# Patient Record
Sex: MALE | Race: BLACK OR AFRICAN AMERICAN | Employment: FULL TIME | URBAN - METROPOLITAN AREA
[De-identification: names, ages, dates, MRNs, and addresses within clinical notes are randomized per-mention and may not be internally consistent; named-entity substitution may affect disease eponyms.]

---

## 2020-10-03 ENCOUNTER — HOSPITAL ENCOUNTER (EMERGENCY)
Age: 45
Discharge: HOME | End: 2020-10-03
Attending: EMERGENCY MEDICINE
Payer: COMMERCIAL

## 2020-10-03 VITALS
HEART RATE: 100 BPM | SYSTOLIC BLOOD PRESSURE: 123 MMHG | OXYGEN SATURATION: 94 % | BODY MASS INDEX: 35.55 KG/M2 | TEMPERATURE: 98.4 F | WEIGHT: 234.57 LBS | DIASTOLIC BLOOD PRESSURE: 94 MMHG | RESPIRATION RATE: 20 BRPM | HEIGHT: 68 IN

## 2020-10-03 DIAGNOSIS — L02.91 ABSCESS: Primary | ICD-10-CM

## 2020-10-03 PROCEDURE — 99282 EMERGENCY DEPT VISIT SF MDM: CPT

## 2020-10-03 PROCEDURE — 75810000289 HC I&D ABSCESS SIMP/COMP/MULT

## 2020-10-03 PROCEDURE — 74011000250 HC RX REV CODE- 250: Performed by: EMERGENCY MEDICINE

## 2020-10-03 RX ORDER — CEPHALEXIN 500 MG/1
500 CAPSULE ORAL 4 TIMES DAILY
Qty: 28 CAP | Refills: 0 | Status: SHIPPED | OUTPATIENT
Start: 2020-10-03 | End: 2020-10-10

## 2020-10-03 RX ORDER — LIDOCAINE HYDROCHLORIDE AND EPINEPHRINE 10; 10 MG/ML; UG/ML
10 INJECTION, SOLUTION INFILTRATION; PERINEURAL
Status: COMPLETED | OUTPATIENT
Start: 2020-10-03 | End: 2020-10-03

## 2020-10-03 RX ORDER — FENOFIBRATE 145 MG/1
145 TABLET, COATED ORAL
COMMUNITY
Start: 2020-08-31 | End: 2020-12-15 | Stop reason: SDUPTHER

## 2020-10-03 RX ORDER — METFORMIN HYDROCHLORIDE 500 MG/1
500 TABLET, EXTENDED RELEASE ORAL
COMMUNITY
Start: 2020-08-31 | End: 2020-12-15 | Stop reason: SDUPTHER

## 2020-10-03 RX ADMIN — LIDOCAINE HYDROCHLORIDE,EPINEPHRINE BITARTRATE 100 MG: 10; .01 INJECTION, SOLUTION INFILTRATION; PERINEURAL at 15:48

## 2020-10-03 NOTE — ED TRIAGE NOTES
Pt reports \"he has a boil in between his butt that has been there for one week. \" Pt reports \"it started to drain and then it stopped. \" Pt denies fevers or medications prior to coming to ED.

## 2020-10-03 NOTE — ED NOTES
Discharge instructions reviewed with pt and copy given along with RX by this RN. Pt educated on follow up with PCP and to take full course of antibiotics as prescribed by ER MD Parag Sanchez. Pt verbalized understanding of all education and is ambulatory from ED accompanied by spouse in no sign of distress or discomfort.

## 2020-10-03 NOTE — ED PROVIDER NOTES
42-year-old male with a history of abscesses presents with a chief complaint of an abscess. The patient reports that he has had an abscess in the crack of his bottom for the last week. He has not had any fevers. He has been doing sitz bath's with no relief. Past Medical History:   Diagnosis Date    Abscess        History reviewed. No pertinent surgical history. History reviewed. No pertinent family history. Social History     Socioeconomic History    Marital status:      Spouse name: Not on file    Number of children: Not on file    Years of education: Not on file    Highest education level: Not on file   Occupational History    Not on file   Social Needs    Financial resource strain: Not on file    Food insecurity     Worry: Not on file     Inability: Not on file    Transportation needs     Medical: Not on file     Non-medical: Not on file   Tobacco Use    Smoking status: Current Every Day Smoker     Packs/day: 0.50     Years: 17.00     Pack years: 8.50    Smokeless tobacco: Never Used   Substance and Sexual Activity    Alcohol use:  Yes     Alcohol/week: 9.0 standard drinks     Types: 6 Cans of beer, 3 Shots of liquor per week    Drug use: Never    Sexual activity: Not on file   Lifestyle    Physical activity     Days per week: Not on file     Minutes per session: Not on file    Stress: Not on file   Relationships    Social connections     Talks on phone: Not on file     Gets together: Not on file     Attends [employer] service: Not on file     Active member of club or organization: Not on file     Attends meetings of clubs or organizations: Not on file     Relationship status: Not on file    Intimate partner violence     Fear of current or ex partner: Not on file     Emotionally abused: Not on file     Physically abused: Not on file     Forced sexual activity: Not on file   Other Topics Concern    Not on file   Social History Narrative    Not on file ALLERGIES: Patient has no known allergies. Review of Systems   Constitutional: Negative for fever. Skin: Positive for wound. Vitals:    10/03/20 1509   BP: (!) 123/94   Pulse: 100   Resp: 20   Temp: 98.4 °F (36.9 °C)   SpO2: 94%   Weight: 106.4 kg (234 lb 9.1 oz)   Height: 5' 8\" (1.727 m)            Physical Exam  Vitals signs and nursing note reviewed. Constitutional:       General: He is not in acute distress. Appearance: Normal appearance. He is not ill-appearing, toxic-appearing or diaphoretic. HENT:      Head: Normocephalic. Eyes:      Extraocular Movements: Extraocular movements intact. Neck:      Musculoskeletal: Normal range of motion. Cardiovascular:      Rate and Rhythm: Normal rate. Pulses: Normal pulses. Heart sounds: Normal heart sounds. No murmur. No friction rub. No gallop. Pulmonary:      Effort: Pulmonary effort is normal. No respiratory distress. Breath sounds: Normal breath sounds. No wheezing, rhonchi or rales. Abdominal:      General: There is no distension. Tenderness: There is no abdominal tenderness. Genitourinary:     Comments: Induration on the right buttocks near the top of the intergluteal cleft  Musculoskeletal: Normal range of motion. Skin:     General: Skin is warm and dry. Capillary Refill: Capillary refill takes less than 2 seconds. Neurological:      Mental Status: He is alert and oriented to person, place, and time. Psychiatric:         Mood and Affect: Mood normal.          MDM  Number of Diagnoses or Management Options  Abscess:   Diagnosis management comments: Patient presents with an abscess at the intergluteal fold. Abscess drained under ultrasound guidance. See procedure note for details. Patient placed on Keflex and I did recommend that he follow-up with his primary care physician. He is comfortable and agreeable to plan of care and aware of his return precautions.          I&D Abcess Simple    Date/Time: 10/4/2020 7:49 AM  Performed by: Sarahi Navarro MD  Authorized by: Sarahi Navarro MD     Consent:     Consent obtained:  Verbal    Consent given by:  Patient    Risks discussed:  Bleeding, incomplete drainage and pain    Alternatives discussed:  Alternative treatment  Location:     Type:  Abscess    Location:  Anogenital    Anogenital location:  Pilonidal  Pre-procedure details:     Skin preparation:  Antiseptic wash  Anesthesia (see MAR for exact dosages): Anesthesia method:  Local infiltration    Local anesthetic:  Lidocaine 1% WITH epi  Procedure type:     Complexity:  Simple  Procedure details:     Needle aspiration: no      Incision types:  Stab incision    Incision depth:  Submucosal    Scalpel blade:  11    Wound management:  Probed and deloculated    Drainage:  Bloody and purulent    Drainage amount: Moderate    Wound treatment:  Wound left open    Packing materials:  None  Post-procedure details:     Patient tolerance of procedure:   Tolerated well, no immediate complications

## 2020-12-15 ENCOUNTER — OFFICE VISIT (OUTPATIENT)
Dept: PRIMARY CARE CLINIC | Age: 45
End: 2020-12-15
Payer: MEDICAID

## 2020-12-15 VITALS
RESPIRATION RATE: 16 BRPM | OXYGEN SATURATION: 97 % | HEART RATE: 73 BPM | WEIGHT: 231.8 LBS | HEIGHT: 69 IN | TEMPERATURE: 97.8 F | SYSTOLIC BLOOD PRESSURE: 123 MMHG | DIASTOLIC BLOOD PRESSURE: 85 MMHG | BODY MASS INDEX: 34.33 KG/M2

## 2020-12-15 DIAGNOSIS — Z76.89 ENCOUNTER TO ESTABLISH CARE: ICD-10-CM

## 2020-12-15 DIAGNOSIS — R73.03 PRE-DIABETES: Primary | ICD-10-CM

## 2020-12-15 DIAGNOSIS — E66.01 SEVERE OBESITY (HCC): ICD-10-CM

## 2020-12-15 DIAGNOSIS — Z23 NEEDS FLU SHOT: ICD-10-CM

## 2020-12-15 DIAGNOSIS — Z76.0 MEDICATION REFILL: ICD-10-CM

## 2020-12-15 DIAGNOSIS — E78.5 HYPERLIPIDEMIA, UNSPECIFIED HYPERLIPIDEMIA TYPE: ICD-10-CM

## 2020-12-15 DIAGNOSIS — R79.89 LOW TESTOSTERONE: ICD-10-CM

## 2020-12-15 DIAGNOSIS — R73.03 PRE-DIABETES: ICD-10-CM

## 2020-12-15 DIAGNOSIS — F17.200 SMOKES: ICD-10-CM

## 2020-12-15 PROCEDURE — 99204 OFFICE O/P NEW MOD 45 MIN: CPT | Performed by: NURSE PRACTITIONER

## 2020-12-15 PROCEDURE — 90686 IIV4 VACC NO PRSV 0.5 ML IM: CPT | Performed by: NURSE PRACTITIONER

## 2020-12-15 PROCEDURE — 90471 IMMUNIZATION ADMIN: CPT | Performed by: NURSE PRACTITIONER

## 2020-12-15 RX ORDER — FENOFIBRATE 145 MG/1
145 TABLET, COATED ORAL DAILY
Qty: 90 TAB | Refills: 1 | Status: SHIPPED | OUTPATIENT
Start: 2020-12-15

## 2020-12-15 RX ORDER — METFORMIN HYDROCHLORIDE 500 MG/1
500 TABLET, EXTENDED RELEASE ORAL
Qty: 90 TAB | Refills: 1 | Status: SHIPPED | OUTPATIENT
Start: 2020-12-15

## 2020-12-15 NOTE — PATIENT INSTRUCTIONS
Vaccine Information Statement Influenza (Flu) Vaccine (Inactivated or Recombinant): What You Need to Know Many Vaccine Information Statements are available in [place]ian and other languages. See www.immunize.org/vis Hojas de información sobre vacunas están disponibles en español y en muchos otros idiomas. Visite www.immunize.org/vis 1. Why get vaccinated? Influenza vaccine can prevent influenza (flu). Flu is a contagious disease that spreads around the [facility] every year, usually between October and May. Anyone can get the flu, but it is more dangerous for some people. Infants and young children, people 72years of age and older, pregnant women, and people with certain health conditions or a weakened immune system are at greatest risk of flu complications. Pneumonia, bronchitis, sinus infections and ear infections are examples of flu-related complications. If you have a medical condition, such as heart disease, cancer or diabetes, flu can make it worse. Flu can cause fever and chills, sore throat, muscle aches, fatigue, cough, headache, and runny or stuffy nose. Some people may have vomiting and diarrhea, though this is more common in children than adults. Each year thousands of people in the [facility] die from flu, and many more are hospitalized. Flu vaccine prevents millions of illnesses and flu-related visits to the doctor each year. 2. Influenza vaccines CDC recommends everyone 10months of age and older get vaccinated every flu season. Children 6 months through 6years of age may need 2 doses during a single flu season. Everyone else needs only 1 dose each flu season. It takes about 2 weeks for protection to develop after vaccination. There are many flu viruses, and they are always changing. Each year a new flu vaccine is made to protect against three or four viruses that are likely to cause disease in the upcoming flu season.  Even when the vaccine doesnt exactly match these viruses, it may still provide some protection. Influenza vaccine does not cause flu. Influenza vaccine may be given at the same time as other vaccines. 3. Talk with your health care provider Tell your vaccine provider if the person getting the vaccine: 
 Has had an allergic reaction after a previous dose of influenza vaccine, or has any severe, life-threatening allergies.  Has ever had Guillain-Barré Syndrome (also called GBS). In some cases, your health care provider may decide to postpone influenza vaccination to a future visit. People with minor illnesses, such as a cold, may be vaccinated. People who are moderately or severely ill should usually wait until they recover before getting influenza vaccine. Your health care provider can give you more information. 4. Risks of a reaction  Soreness, redness, and swelling where shot is given, fever, muscle aches, and headache can happen after influenza vaccine.  There may be a very small increased risk of Guillain-Barré Syndrome (GBS) after inactivated influenza vaccine (the flu shot). Blayne Marrero children who get the flu shot along with pneumococcal vaccine (PCV13), and/or DTaP vaccine at the same time might be slightly more likely to have a seizure caused by fever. Tell your health care provider if a child who is getting flu vaccine has ever had a seizure. People sometimes faint after medical procedures, including vaccination. Tell your provider if you feel dizzy or have vision changes or ringing in the ears. As with any medicine, there is a very remote chance of a vaccine causing a severe allergic reaction, other serious injury, or death. 5. What if there is a serious problem? An allergic reaction could occur after the vaccinated person leaves the clinic.  If you see signs of a severe allergic reaction (hives, swelling of the face and throat, difficulty breathing, a fast heartbeat, dizziness, or weakness), call 9-1-1 and get the person to the nearest hospital. 
 
For other signs that concern you, call your health care provider. Adverse reactions should be reported to the Vaccine Adverse Event Reporting System (VAERS). Your health care provider will usually file this report, or you can do it yourself. Visit the VAERS website at www.vaers. hhs.gov or call 0-763.759.7656. VAERS is only for reporting reactions, and VAERS staff do not give medical advice. 6. The National Vaccine Injury Compensation Program 
 
The [facility] Vaccine Injury Compensation Program (VICP) is a federal program that was created to compensate people who may have been injured by certain vaccines. Visit the VICP website at www.[employer].gov/vaccinecompensation or call 7-870.913.7065 to learn about the program and about filing a claim. There is a time limit to file a claim for compensation. 7. How can I learn more?  Ask your health care provider.  Call your local or state health department.  Contact the Centers for Disease Control and Prevention (CDC): 
- Call 7-828.483.8325 (4-055-KQB-INFO) or 
- Visit CDCs influenza website at www.cdc.gov/flu Vaccine Information Statement (Interim) Inactivated Influenza Vaccine 8/15/2019 
42 HOLLYDomonique Tate [id number]-[id number] Department of [facility] and [employer] Centers for Disease Control and Prevention Office Use Only Learning About High Cholesterol What is high cholesterol? High cholesterol means that you have too much cholesterol in your blood. Cholesterol is a type of fat. It's needed for many body functions, such as making new cells. Cholesterol is made by your body. It also comes from food you eat. Having high cholesterol can lead to the buildup of plaque in artery walls. This can increase your risk of heart disease and stroke.  
When your doctor talks about high cholesterol levels, he or she is talking about your total cholesterol and LDL cholesterol (the \"bad\" cholesterol) levels. Your doctor may also speak about HDL (the \"good\" cholesterol) levels. High HDL is linked with a lower risk for heart disease, heart attack, and stroke. Your cholesterol levels help your doctor find out your risk for having a heart attack or stroke. How can you prevent high cholesterol? A heart-healthy lifestyle can help you prevent high cholesterol. This lifestyle helps lower your risk for a heart attack and stroke. · Eat heart-healthy foods. ? Eat fruits, vegetables, whole grains (like oatmeal), dried beans and peas, nuts and seeds, soy products (like tofu), and fat-free or low-fat dairy products. ? Replace butter, margarine, and hydrogenated or partially hydrogenated oils with olive and canola oils. (Canola oil margarine without trans fat is fine.) ? Replace red meat with fish, poultry, and soy protein (like tofu). ? Limit processed and packaged foods like chips, crackers, and cookies. · Be active. Exercise can improve your cholesterol level. Get at least 30 minutes of exercise on most days of the week. Walking is a good choice. You also may want to do other activities, such as running, swimming, cycling, or playing tennis or team sports. · Stay at a healthy weight. Lose weight if you need to. · Don't smoke. If you need help quitting, talk to your doctor about stop-smoking programs and medicines. These can increase your chances of quitting for good. How is high cholesterol treated? The goal of treatment is to reduce your chances of having a heart attack or stroke. The goal is not to lower your cholesterol numbers only. · You may make lifestyle changes, such as eating healthy foods, not smoking, losing weight, and being more active. · You may have to take medicine. Follow-up care is a key part of your treatment and safety. Be sure to make and go to all appointments, and call your doctor if you are having problems.  It's also a good idea to know your test results and keep a list of the medicines you take. Where can you learn more? Go to http://www.gray.com/ Enter Q315 in the search box to learn more about \"Learning About High Cholesterol. \" Current as of: December 16, 2019               Content Version: 12.6 © 8024-0819 [employer]. Care instructions adapted under license by [employer] (which disclaims liability or warranty for this information). If you have questions about a medical condition or this instruction, always ask your healthcare professional. [address] any warranty or liability for your use of this information. Stopping Smoking: Care Instructions Your Care Instructions Cigarette smokers crave the nicotine in cigarettes. Giving it up is much harder than simply changing a habit. Your body has to stop craving the nicotine. It is hard to quit, but you can do it. There are many tools that people use to quit smoking. You may find that combining tools works best for you. There are several steps to quitting. First you get ready to quit. Then you get support to help you. After that, you learn new skills and behaviors to become a nonsmoker. For many people, a necessary step is getting and using medicine. Your doctor will help you set up the plan that best meets your needs. You may want to attend a smoking cessation program to help you quit smoking. When you choose a program, look for one that has proven success. Ask your doctor for ideas. You will greatly increase your chances of success if you take medicine as well as get counseling or join a cessation program. 
Some of the changes you feel when you first quit tobacco are uncomfortable. Your body will miss the nicotine at first, and you may feel short-tempered and grumpy. You may have trouble sleeping or concentrating. Medicine can help you deal with these symptoms.  You may struggle with changing your smoking habits and rituals. The last step is the tricky one: Be prepared for the smoking urge to continue for a time. This is a lot to deal with, but keep at it. You will feel better. Follow-up care is a key part of your treatment and safety. Be sure to make and go to all appointments, and call your doctor if you are having problems. It's also a good idea to know your test results and keep a list of the medicines you take. How can you care for yourself at home? · Ask your family, friends, and coworkers for support. You have a better chance of quitting if you have help and support. · Join a support group, such as Nicotine Anonymous, for people who are trying to quit smoking. · Consider signing up for a smoking cessation program, such as the American Lung Association's Freedom from Smoking program. 
· Get text messaging support. Go to the website at www.smokefree. gov to sign up for the [facility] program. 
· Set a quit date. Pick your date carefully so that it is not right in the middle of a big deadline or stressful time. Once you quit, do not even take a puff. Get rid of all ashtrays and lighters after your last cigarette. Clean your house and your clothes so that they do not smell of smoke. · Learn how to be a nonsmoker. Think about ways you can avoid those things that make you reach for a cigarette. ? Avoid situations that put you at greatest risk for smoking. For some people, it is hard to have a drink with friends without smoking. For others, they might skip a coffee break with coworkers who smoke. ? Change your daily routine. Take a different route to work or eat a meal in a different place. · Cut down on stress. Calm yourself or release tension by doing an activity you enjoy, such as reading a book, taking a hot bath, or gardening. · Talk to your doctor or pharmacist about nicotine replacement therapy, which replaces the nicotine in your body.  You still get nicotine but you do not use tobacco. Nicotine replacement products help you slowly reduce the amount of nicotine you need. These products come in several forms, many of them available over-the-counter: ? Nicotine patches ? Nicotine gum and lozenges ? Nicotine inhaler · Ask your doctor about bupropion (Wellbutrin) or varenicline (Chantix), which are prescription medicines. They do not contain nicotine. They help you by reducing withdrawal symptoms, such as stress and anxiety. · Some people find hypnosis, acupuncture, and massage helpful for ending the smoking habit. · Eat a healthy diet and get regular exercise. Having healthy habits will help your body move past its craving for nicotine. · Be prepared to keep trying. Most people are not successful the first few times they try to quit. Do not get mad at yourself if you smoke again. Make a list of things you learned and think about when you want to try again, such as next week, next month, or next year. Where can you learn more? Go to http://www.gray.com/ Enter Y412 in the search box to learn more about \"Stopping Smoking: Care Instructions. \" Current as of: March 12, 2020               Content Version: 12.6 © 0183-9839 [employer]. Care instructions adapted under license by [employer] (which disclaims liability or warranty for this information). If you have questions about a medical condition or this instruction, always ask your healthcare professional. [place] [address] any warranty or liability for your use of this information.

## 2020-12-15 NOTE — PROGRESS NOTES
Chief Complaint   Patient presents with    New Patient     establishing primary care      1. Have you been to the ER, urgent care clinic since your last visit? Hospitalized since your last visit? [facility]    2. Have you seen or consulted any other health care providers outside of the [address] [place] [address] since your last visit? Include any pap smears or colon screening.  Yes Where: [facility]

## 2020-12-15 NOTE — PROGRESS NOTES
Patient went to primary care in [place], [place], and doesn't remember name. Patient needs refills in both meds documented today. Patient is from [place], has been in [place] for 2.5 years.

## 2020-12-15 NOTE — PROGRESS NOTES
This note will not be viewable in [address] Maggi Meneses is a 41y.o. year old male who is a new patient to me today. Maggi Meneses is a  41 y.o. male presents for visit. Chief Complaint   Patient presents with    New Patient     establishing primary care     Medication Refill    Labs       HPI    Patient presents with his wife to establish care, for medication refills and lab work. Reports a history of prediabetes, hyperlipidemia, low testosterone level. Endorses decreased libido. He takes metformin for prediabetes and TriCor for hyperlipidemia. Requests refills. Does not check his blood sugar at home. He smokes cigarettes and BMI is 34.7, obese. Review of Systems   Constitutional: Negative for chills and fever. Respiratory: Negative for cough and shortness of breath. Cardiovascular: Negative for chest pain and palpitations. Gastrointestinal: Negative for abdominal pain, blood in stool, constipation, heartburn, nausea and vomiting. Increased BM frequency   Genitourinary: Negative. Decreased libido. All other systems reviewed and are negative. Past Medical History:   Diagnosis Date    Abscess     Diabetes ([facility] [address]     History of high cholesterol     Low testosterone in male 2017      History reviewed. No pertinent surgical history. Social History     Tobacco Use    Smoking status: Current Every Day Smoker     Packs/day: 0.50     Years: 17.00     Pack years: 8.50    Smokeless tobacco: Never Used   Substance Use Topics    Alcohol use: Yes     Alcohol/week: 9.0 standard drinks     Types: 6 Cans of beer, 3 Shots of liquor per week     Comment: occasionally 2-3 times a week      Social History     Social History Narrative    Not on file     Family History   Problem Relation Age of Onset    Diabetes Mother     Deep Vein Thrombosis Mother       Prior to Admission medications    Medication Sig Start Date End Date Taking?  Authorizing Provider metFORMIN ER (GLUCOPHAGE XR) 500 mg tablet Take 1 Tab by mouth daily (with dinner). 1 x time daily with dinner 12/15/20  Yes Viridiana Murphy NP   fenofibrate nanocrystallized (TRICOR) 145 mg tablet Take 1 Tab by mouth daily. Takes by mouth 1x daily with dinner. 12/15/20  Yes Deborah Bhat NP      No Known Allergies       Visit Vitals  /85 (BP 1 Location: Right arm, BP Patient Position: Sitting)   Pulse 73   Temp 97.8 °F (36.6 °C) (Temporal)   Resp 16   Ht 5' 8.5\" (1.74 m)   Wt 231 lb 12.8 oz (105.1 kg)   SpO2 97%   BMI 34.73 kg/m²     Physical Exam  Vitals signs and nursing note reviewed. Constitutional:       General: He is not in acute distress. Appearance: Normal appearance. He is well-developed. He is obese. HENT:      Head: Normocephalic and atraumatic. Right Ear: External ear normal.      Left Ear: External ear normal.      Nose: Nose normal.   Eyes:      Conjunctiva/sclera: Conjunctivae normal.   Neck:      Musculoskeletal: Neck supple. Cardiovascular:      Rate and Rhythm: Normal rate and regular rhythm. Heart sounds: Normal heart sounds. Pulmonary:      Effort: Pulmonary effort is normal.      Breath sounds: Normal breath sounds. No wheezing. Chest:      Chest wall: No tenderness. Abdominal:      General: Bowel sounds are normal. There is no distension. Palpations: Abdomen is soft. Tenderness: There is no abdominal tenderness. Musculoskeletal:      Right lower leg: No edema. Left lower leg: No edema. Skin:     General: Skin is warm and dry. Neurological:      Mental Status: He is alert and oriented to person, place, and time. Psychiatric:         Mood and Affect: Mood normal.         Speech: Speech normal.         Behavior: Behavior normal.               No results found for this or any previous visit (from the past 24 hour(s)). ASSESSMENT AND PLAN:  There are no active problems to display for this patient.       ICD-10-CM ICD-9-CM 1. Pre-diabetes  R73.03 790.29   2. Low testosterone  R79.89 790.99   3. Hyperlipidemia, unspecified hyperlipidemia type  E78.5 272.4   4. Severe obesity ([address]  E66.01 278.01   5. Smokes  F17.200 305.1   6. Needs flu shot  Z23 V04.81   7. Medication refill  Z76.0 V68.1   8. Encounter to establish care  Z76.89 V65.8     Orders Placed This Encounter    Influenza Virus Vaccine QUAD, PF Syr 6 Months + (Flulaval, Fluzone, Fluarix [id number])    LIPID PANEL     Standing Status:   Future     Number of Occurrences:   1     Standing Expiration Date:   12/15/2021    HEMOGLOBIN A1C WITH EAG     Standing Status:   Future     Number of Occurrences:   1     Standing Expiration Date:   12/15/2021    TESTOSTERONE, FREE & TOTAL     Standing Status:   Future     Number of Occurrences:   1     Standing Expiration Date:   45/08/3215    METABOLIC PANEL, BASIC     Standing Status:   Future     Number of Occurrences:   1     Standing Expiration Date:   43/51/8749    METABOLIC PANEL, BASIC    LIPID PANEL    HEMOGLOBIN A1C WITH EAG    TESTOSTERONE, FREE & TOTAL    metFORMIN ER (GLUCOPHAGE XR) 500 mg tablet     Sig: Take 1 Tab by mouth daily (with dinner). 1 x time daily with dinner     Dispense:  90 Tab     Refill:  1    fenofibrate nanocrystallized (TRICOR) 145 mg tablet     Sig: Take 1 Tab by mouth daily. Takes by mouth 1x daily with dinner. Dispense:  90 Tab     Refill:  1       Diagnoses and all orders for this visit:    1. Pre-diabetes  -     HEMOGLOBIN A1C WITH EAG; Future  -     metFORMIN ER (GLUCOPHAGE XR) 500 mg tablet; Take 1 Tab by mouth daily (with dinner). 1 x time daily with dinner  -     METABOLIC PANEL, BASIC; Future    2. Low testosterone  -     TESTOSTERONE, FREE & TOTAL; Future       -      Advised patient I will refer to urology for treatment if testosterone level low. 3. Hyperlipidemia, unspecified hyperlipidemia type  -     LIPID PANEL;  Future  -     fenofibrate nanocrystallized (TRICOR) 145 mg tablet; Take 1 Tab by mouth daily. Takes by mouth 1x daily with dinner.  -     METABOLIC PANEL, BASIC; Future    4. Smokes    5. Needs flu shot  -     INFLUENZA VIRUS VAC QUAD,SPLIT,PRESV FREE SYRINGE IM    6. Medication refill  -     metFORMIN ER (GLUCOPHAGE XR) 500 mg tablet; Take 1 Tab by mouth daily (with dinner). 1 x time daily with dinner  -     fenofibrate nanocrystallized (TRICOR) 145 mg tablet; Take 1 Tab by mouth daily. Takes by mouth 1x daily with dinner. 7. Encounter to establish care      lab results and schedule of future lab studies reviewed with patient  reviewed diet, exercise and weight control  very strongly urged to quit smoking to reduce cardiovascular risk  Continue current treatment pending lab results. Follow-up and Dispositions    · Return in about 6 months (around 6/15/2021), or if symptoms worsen or fail to improve, for chronic care. Disclaimer:  Advised him to call back or return to office if symptoms worsen/change/persist.  Discussed expected course/resolution/complications of diagnosis in detail with patient. Medication risks/benefits/costs/interactions/alternatives discussed with patient. He was given an after visit summary which includes diagnoses, current medications, & vitals. He expressed understanding with the diagnosis and plan.

## 2020-12-18 LAB
BUN SERPL-MCNC: 10 MG/DL (ref 6–24)
BUN/CREAT SERPL: 9 (ref 9–20)
CALCIUM SERPL-MCNC: 9.1 MG/DL (ref 8.7–10.2)
CHLORIDE SERPL-SCNC: 105 MMOL/L (ref 96–106)
CHOLEST SERPL-MCNC: 228 MG/DL (ref 100–199)
CO2 SERPL-SCNC: 22 MMOL/L (ref 20–29)
CREAT SERPL-MCNC: 1.17 MG/DL (ref 0.76–1.27)
EST. AVERAGE GLUCOSE BLD GHB EST-MCNC: 140 MG/DL
GLUCOSE SERPL-MCNC: 88 MG/DL (ref 65–99)
HBA1C MFR BLD: 6.5 % (ref 4.8–5.6)
HDLC SERPL-MCNC: 52 MG/DL
LDLC SERPL CALC-MCNC: 126 MG/DL (ref 0–99)
POTASSIUM SERPL-SCNC: 4.4 MMOL/L (ref 3.5–5.2)
SODIUM SERPL-SCNC: 141 MMOL/L (ref 134–144)
TESTOST FREE SERPL-MCNC: 13.5 PG/ML (ref 6.8–21.5)
TESTOST SERPL-MCNC: 367 NG/DL (ref 264–916)
TRIGL SERPL-MCNC: 285 MG/DL (ref 0–149)
VLDLC SERPL CALC-MCNC: 50 MG/DL (ref 5–40)

## 2020-12-28 DIAGNOSIS — R79.89 LOW TESTOSTERONE: Primary | ICD-10-CM

## 2020-12-28 NOTE — PROGRESS NOTES
Please call patient and let him know his cholesterol is high and HgA1c shows DM2. Please ask him to schedule an appointment to discuss med treatment options. VV is ok. Testosterone level is on the low side of normal. I referred him to [facility] per our discussion in the visit. Please provide the referral contact information. Thanks.

## 2020-12-28 NOTE — PROGRESS NOTES
Called patient and identified patient by , gave him Provider's notes, and referral information, patient was just arriving to work, and didn't have anything to write down referral.  Julio Ax him that we would mail it to him as well, but he needed to call back and schedule an appt in person or VV so that NP can discuss treatment options for DM2 and high cholesterol. Patient understood.

## 2021-02-25 ENCOUNTER — APPOINTMENT (OUTPATIENT)
Dept: CT IMAGING | Age: 46
End: 2021-02-25
Attending: EMERGENCY MEDICINE
Payer: MEDICAID

## 2021-02-25 ENCOUNTER — HOSPITAL ENCOUNTER (EMERGENCY)
Age: 46
Discharge: HOME | End: 2021-02-25
Attending: EMERGENCY MEDICINE
Payer: MEDICAID

## 2021-02-25 VITALS
OXYGEN SATURATION: 96 % | TEMPERATURE: 98.2 F | SYSTOLIC BLOOD PRESSURE: 116 MMHG | HEART RATE: 79 BPM | BODY MASS INDEX: 33.76 KG/M2 | DIASTOLIC BLOOD PRESSURE: 78 MMHG | WEIGHT: 225.31 LBS | RESPIRATION RATE: 16 BRPM

## 2021-02-25 DIAGNOSIS — R10.32 ABDOMINAL PAIN, LLQ (LEFT LOWER QUADRANT): ICD-10-CM

## 2021-02-25 DIAGNOSIS — L72.9 BENIGN SKIN CYST: ICD-10-CM

## 2021-02-25 DIAGNOSIS — K57.32 DIVERTICULITIS LARGE INTESTINE W/O PERFORATION OR ABSCESS W/O BLEEDING: Primary | ICD-10-CM

## 2021-02-25 DIAGNOSIS — R79.89 ELEVATED SERUM CREATININE: ICD-10-CM

## 2021-02-25 LAB
ALBUMIN SERPL-MCNC: 3.5 G/DL (ref 3.5–5)
ALBUMIN/GLOB SERPL: 0.8 {RATIO} (ref 1.1–2.2)
ALP SERPL-CCNC: 54 U/L (ref 45–117)
ALT SERPL-CCNC: 42 U/L (ref 12–78)
AMORPH CRY URNS QL MICRO: ABNORMAL
ANION GAP SERPL CALC-SCNC: 11 MMOL/L (ref 5–15)
APPEARANCE UR: ABNORMAL
AST SERPL-CCNC: 28 U/L (ref 15–37)
BACTERIA URNS QL MICRO: NEGATIVE /HPF
BASOPHILS # BLD: 0.1 K/UL (ref 0–0.1)
BASOPHILS NFR BLD: 1 % (ref 0–1)
BILIRUB SERPL-MCNC: 0.5 MG/DL (ref 0.2–1)
BILIRUB UR QL: NEGATIVE
BUN SERPL-MCNC: 14 MG/DL (ref 6–20)
BUN/CREAT SERPL: 10 (ref 12–20)
CALCIUM SERPL-MCNC: 9 MG/DL (ref 8.5–10.1)
CHLORIDE SERPL-SCNC: 104 MMOL/L (ref 97–108)
CO2 SERPL-SCNC: 25 MMOL/L (ref 21–32)
COLOR UR: ABNORMAL
COMMENT, HOLDF: NORMAL
CREAT SERPL-MCNC: 1.43 MG/DL (ref 0.7–1.3)
DIFFERENTIAL METHOD BLD: NORMAL
EOSINOPHIL # BLD: 0.1 K/UL (ref 0–0.4)
EOSINOPHIL NFR BLD: 1 % (ref 0–7)
EPITH CASTS URNS QL MICRO: ABNORMAL /LPF
ERYTHROCYTE [DISTWIDTH] IN BLOOD BY AUTOMATED COUNT: 12.3 % (ref 11.5–14.5)
GLOBULIN SER CALC-MCNC: 4.6 G/DL (ref 2–4)
GLUCOSE SERPL-MCNC: 106 MG/DL (ref 65–100)
GLUCOSE UR STRIP.AUTO-MCNC: NEGATIVE MG/DL
HCT VFR BLD AUTO: 42 % (ref 36.6–50.3)
HGB BLD-MCNC: 13.6 G/DL (ref 12.1–17)
HGB UR QL STRIP: NEGATIVE
IMM GRANULOCYTES # BLD AUTO: 0 K/UL (ref 0–0.04)
IMM GRANULOCYTES NFR BLD AUTO: 0 % (ref 0–0.5)
KETONES UR QL STRIP.AUTO: NEGATIVE MG/DL
LEUKOCYTE ESTERASE UR QL STRIP.AUTO: NEGATIVE
LIPASE SERPL-CCNC: 135 U/L (ref 73–393)
LYMPHOCYTES # BLD: 2.4 K/UL (ref 0.8–3.5)
LYMPHOCYTES NFR BLD: 23 % (ref 12–49)
MCH RBC QN AUTO: 31.3 PG (ref 26–34)
MCHC RBC AUTO-ENTMCNC: 32.4 G/DL (ref 30–36.5)
MCV RBC AUTO: 96.6 FL (ref 80–99)
MONOCYTES # BLD: 0.6 K/UL (ref 0–1)
MONOCYTES NFR BLD: 6 % (ref 5–13)
NEUTS SEG # BLD: 6.9 K/UL (ref 1.8–8)
NEUTS SEG NFR BLD: 69 % (ref 32–75)
NITRITE UR QL STRIP.AUTO: NEGATIVE
NRBC # BLD: 0 K/UL (ref 0–0.01)
NRBC BLD-RTO: 0 PER 100 WBC
PH UR STRIP: 5.5 [PH] (ref 5–8)
PLATELET # BLD AUTO: 200 K/UL (ref 150–400)
PMV BLD AUTO: 11.3 FL (ref 8.9–12.9)
POTASSIUM SERPL-SCNC: 3.7 MMOL/L (ref 3.5–5.1)
PROT SERPL-MCNC: 8.1 G/DL (ref 6.4–8.2)
PROT UR STRIP-MCNC: NEGATIVE MG/DL
RBC # BLD AUTO: 4.35 M/UL (ref 4.1–5.7)
RBC #/AREA URNS HPF: ABNORMAL /HPF (ref 0–5)
SAMPLES BEING HELD,HOLD: NORMAL
SODIUM SERPL-SCNC: 140 MMOL/L (ref 136–145)
SP GR UR REFRACTOMETRY: >1.03 (ref 1–1.03)
UR CULT HOLD, URHOLD: NORMAL
UROBILINOGEN UR QL STRIP.AUTO: 2 EU/DL (ref 0.2–1)
WBC # BLD AUTO: 10.1 K/UL (ref 4.1–11.1)
WBC URNS QL MICRO: ABNORMAL /HPF (ref 0–4)

## 2021-02-25 PROCEDURE — 96361 HYDRATE IV INFUSION ADD-ON: CPT

## 2021-02-25 PROCEDURE — 74177 CT ABD & PELVIS W/CONTRAST: CPT

## 2021-02-25 PROCEDURE — 83690 ASSAY OF LIPASE: CPT

## 2021-02-25 PROCEDURE — 36415 COLL VENOUS BLD VENIPUNCTURE: CPT

## 2021-02-25 PROCEDURE — 81001 URINALYSIS AUTO W/SCOPE: CPT

## 2021-02-25 PROCEDURE — 99284 EMERGENCY DEPT VISIT MOD MDM: CPT

## 2021-02-25 PROCEDURE — 96374 THER/PROPH/DIAG INJ IV PUSH: CPT

## 2021-02-25 PROCEDURE — 80053 COMPREHEN METABOLIC PANEL: CPT

## 2021-02-25 PROCEDURE — 74011000636 HC RX REV CODE- 636: Performed by: EMERGENCY MEDICINE

## 2021-02-25 PROCEDURE — 74011250636 HC RX REV CODE- 250/636: Performed by: EMERGENCY MEDICINE

## 2021-02-25 PROCEDURE — 85025 COMPLETE CBC W/AUTO DIFF WBC: CPT

## 2021-02-25 RX ORDER — SODIUM CHLORIDE 9 MG/ML
50 INJECTION, SOLUTION INTRAVENOUS
Status: DISCONTINUED | OUTPATIENT
Start: 2021-02-25 | End: 2021-02-25

## 2021-02-25 RX ORDER — KETOROLAC TROMETHAMINE 30 MG/ML
15 INJECTION, SOLUTION INTRAMUSCULAR; INTRAVENOUS
Status: COMPLETED | OUTPATIENT
Start: 2021-02-25 | End: 2021-02-25

## 2021-02-25 RX ORDER — AMOXICILLIN AND CLAVULANATE POTASSIUM 875; 125 MG/1; MG/1
1 TABLET, FILM COATED ORAL 2 TIMES DAILY
Qty: 20 TAB | Refills: 0 | Status: SHIPPED | OUTPATIENT
Start: 2021-02-25 | End: 2021-02-25 | Stop reason: SDUPTHER

## 2021-02-25 RX ORDER — AMOXICILLIN AND CLAVULANATE POTASSIUM 875; 125 MG/1; MG/1
1 TABLET, FILM COATED ORAL 2 TIMES DAILY
Qty: 20 TAB | Refills: 0 | Status: SHIPPED | OUTPATIENT
Start: 2021-02-25 | End: 2021-03-07

## 2021-02-25 RX ORDER — SODIUM CHLORIDE 9 MG/ML
10 INJECTION INTRAMUSCULAR; INTRAVENOUS; SUBCUTANEOUS ONCE
Status: DISCONTINUED | OUTPATIENT
Start: 2021-02-25 | End: 2021-02-25

## 2021-02-25 RX ADMIN — SODIUM CHLORIDE 1000 ML: 9 INJECTION, SOLUTION INTRAVENOUS at 19:16

## 2021-02-25 RX ADMIN — IOPAMIDOL 100 ML: 755 INJECTION, SOLUTION INTRAVENOUS at 19:45

## 2021-02-25 RX ADMIN — KETOROLAC TROMETHAMINE 15 MG: 30 INJECTION, SOLUTION INTRAMUSCULAR at 18:59

## 2021-02-25 NOTE — ED PROVIDER NOTES
Patient is a 69-year-old male with history of diabetes, hyperlipidemia, low testosterone, presenting to emergency department for evaluation of left lower quadrant abdominal pain which has been present for the past 5 days. Patient states that pain is constant and worsening. Pain is nonradiating. Pain seems to worsen after he eats. Last bowel movement was today which was normal for him. He denies fever, chills, chest pain, shortness of breath, nausea, vomiting, diarrhea, constipation, dysuria, hematuria, bloody stools, or any other medical complaints at this time. He has not had a colonoscopy. Is not followed by GI. Patient has an additional complaint of a painful area to the left mid back which is been present for the past several years, his wife states that she drained fluid from it several days ago which was dark with a foul odor. Please note that this dictation was completed with [employer], the computer voice recognition software.  Quite often unanticipated grammatical, syntax, homophones, and other interpretive errors are inadvertently transcribed by the computer software.  Please disregard these errors.  Please excuse any errors that have escaped final proofreading.              Past Medical History:   Diagnosis Date    Abscess     Diabetes ([facility] [address]     History of high cholesterol     Low testosterone in male 2017       Past Surgical History:   Procedure Laterality Date    HX HEENT      Left Eye         Family History:   Problem Relation Age of Onset    Diabetes Mother     Deep Vein Thrombosis Mother        Social History     Socioeconomic History    Marital status:      Spouse name: Not on file    Number of children: Not on file    Years of education: Not on file    Highest education level: Not on file   Occupational History    Not on file   Social Needs    Financial resource strain: Not on file    Food insecurity     Worry: Not on file     Inability: Not on file   LUBB-TEX needs     Medical: Not on file     Non-medical: Not on file   Tobacco Use    Smoking status: Current Every Day Smoker     Packs/day: 0.50     Years: 17.00     Pack years: 8.50    Smokeless tobacco: Never Used   Substance and Sexual Activity    Alcohol use: Yes     Alcohol/week: 9.0 standard drinks     Types: 6 Cans of beer, 3 Shots of liquor per week     Comment: occasionally 2-3 times a week    Drug use: Never    Sexual activity: Not on file   Lifestyle    Physical activity     Days per week: Not on file     Minutes per session: Not on file    Stress: Not on file   Relationships    Social connections     Talks on phone: Not on file     Gets together: Not on file     Attends [employer] service: Not on file     Active member of club or organization: Not on file     Attends meetings of clubs or organizations: Not on file     Relationship status: Not on file    Intimate partner violence     Fear of current or ex partner: Not on file     Emotionally abused: Not on file     Physically abused: Not on file     Forced sexual activity: Not on file   Other Topics Concern    Not on file   Social History Narrative    Not on file         ALLERGIES: Patient has no known allergies. Review of Systems   Constitutional: Negative for chills and fever. HENT: Negative for ear pain and sore throat. Eyes: Negative for visual disturbance. Respiratory: Negative for cough and shortness of breath. Cardiovascular: Negative for chest pain. Gastrointestinal: Positive for abdominal pain. Negative for blood in stool, diarrhea, nausea and vomiting. Genitourinary: Negative for flank pain. Musculoskeletal: Negative for back pain. Skin: Negative for color change. Neurological: Negative for dizziness and headaches. Psychiatric/Behavioral: Negative for confusion.        Vitals:    02/25/21 1830   BP: 130/88   Pulse: 90   Resp: 16   Temp: 98.2 °F (36.8 °C)   SpO2: 96%   Weight: 102.2 kg (225 lb 5 oz)            Physical Exam  Vitals signs and nursing note reviewed. Constitutional:       General: He is not in acute distress. Appearance: Normal appearance. He is not ill-appearing. HENT:      Head: Normocephalic and atraumatic. Mouth/Throat:      Pharynx: Oropharynx is clear. Eyes:      Extraocular Movements: Extraocular movements intact. Conjunctiva/sclera: Conjunctivae normal.   Neck:      Musculoskeletal: No neck rigidity. Cardiovascular:      Rate and Rhythm: Normal rate and regular rhythm. Pulmonary:      Effort: Pulmonary effort is normal.      Breath sounds: Normal breath sounds. Abdominal:      General: There is no distension. Palpations: Abdomen is soft. Tenderness: There is abdominal tenderness in the left lower quadrant. There is no right CVA tenderness, left CVA tenderness, guarding or rebound. Negative signs include Fernandez's sign, Rovsing's sign and McBurney's sign. Musculoskeletal: Normal range of motion. Skin:     General: Skin is warm and dry. Neurological:      General: No focal deficit present. Mental Status: He is alert and oriented to person, place, and time. Psychiatric:         Mood and Affect: Mood normal.          MDM  Number of Diagnoses or Management Options  Abdominal pain, LLQ (left lower quadrant)  Diverticulitis large intestine w/o perforation or abscess w/o bleeding  Elevated serum creatinine  Diagnosis management comments: Patient is alert, afebrile, vitals stable. Abdominal pain x5 days. Abdomen is soft, nondistended, tenderness in left lower quadrant and left flank region. No CVA tenderness or urinary symptoms. He also has a cyst to the left mid back, no visible signs of cellulitis or abscess. Lab work today shows his creatinine is mildly over baseline, 1 L of fluids given. Remainder of lab work unremarkable. Urinalysis shows crystals but is otherwise clear. Abdominal CT consistent with diverticulitis.   Patient reevaluated, pain improved, is tolerating p.o. intake, informed him of findings and he will be discharged home with oral antibiotics and outpatient follow-up with GI as needed. Strict return precautions outlined to return to ED with any worsening pain, fever, vomiting, or any other severe symptoms. I will also refer him to dermatology for excision of his cyst.  Return precautions outlined. All questions answered at this time. Amount and/or Complexity of Data Reviewed  Clinical lab tests: reviewed  Tests in the radiology section of CPT®: reviewed  Tests in the medicine section of CPT®: reviewed      ED Course as of Feb 25 1954   Thu Feb 25, 2021 1914 Creatinine mildly over baseline, IVFs started for rehydration    METABOLIC PANEL, COMPREHENSIVE(!):    Sodium 140   Potassium 3.7   Chloride 104   CO2 25   Anion gap 11   Glucose 106(!)   BUN 14   Creatinine 1.43(!)   BUN/Creatinine ratio 10(!)   GFR est AA >60   GFR est non-AA 54(!)   Calcium 9.0   Bilirubin, total 0.5   ALT 42   AST 28   Alk. phosphatase 54   Protein, total 8.1   Albumin 3.5   Globulin 4.6(!)   A-G Ratio 0.8(!) [EP]   1914 CBC WITH AUTOMATED DIFF:    WBC 10.1   RBC 4.35   HGB 13.6   HCT 42.0   MCV 96.6   MCH 31.3   MCHC 32.4   RDW 12.3   PLATELET 737   MPV 61.6   NRBC 0.0   ABSOLUTE NRBC 0.00   NEUTROPHILS 69   LYMPHOCYTES 23   MONOCYTES 6   EOSINOPHILS 1   BASOPHILS 1   IMMATURE GRANULOCYTES 0   ABS. NEUTROPHILS 6.9   ABS. LYMPHOCYTES 2.4   ABS. MONOCYTES 0.6   ABS. EOSINOPHILS 0.1   ABS. BASOPHILS 0.1   ABS. IMM.  GRANS. 0.0   DF AUTOMATED [EP]   1914 Lipase: 135 [EP]   1930 URINALYSIS W/MICROSCOPIC(!):    Color DARK YELLOW   Appearance CLOUDY(!)   Specific gravity >1.030(!)   pH (UA) 5.5   Protein Negative   Glucose Negative   Ketone Negative   Bilirubin Negative   Blood Negative   Urobilinogen 2.0(!)   Nitrites Negative   Leukocyte Esterase Negative   WBC 0-4   RBC 0-5   Epithelial cells FEW   Bacteria Negative   Amorphous Crystals 3+(!) [EP]   1954 IMPRESSION  1. There is a minimal amount of diverticulosis in descending and sigmoid colon. There is a short segment of stranding/inflammatory change adjacent to a  prominent diverticulum in the left paracolic gutter. Findings suggest  diverticulitis.      CT ABD PELV W CONT [EP]      ED Course User Index  [EP] DARSHANA Chavez     8:13 PM  Pt has been reevaluated. There are no new complaints, changes, or physical findings at this time. Medications have been reviewed w/ pt and/or family. Pt and/or family's questions have been answered. Pt and/or family expressed good understanding of the dx/tx/rx and is in agreement with plan of care. Pt instructed and agreed to f/u w/ GI and to return to ED upon further deterioration. Pt is ready for discharge. IMPRESSION:  1. Diverticulitis large intestine w/o perforation or abscess w/o bleeding    2. Abdominal pain, LLQ (left lower quadrant)    3. Elevated serum creatinine    4. Benign skin cyst        PLAN:  1. Current Discharge Medication List      START taking these medications    Details   amoxicillin-clavulanate (Augmentin) 875-125 mg per tablet Take 1 Tab by mouth two (2) times a day for 10 days. Qty: 20 Tab, Refills: 0           2.    Follow-up Information     Follow up With Specialties Details Why Contact Info    Joanne Gale NP Nurse Practitioner Schedule an appointment as soon as possible for a visit in 1 week For repeat lab work for kidney function [address]  Schedule an appointment as soon as possible for a visit   [address] [facility] [address] [place] [address] Dermatology  Schedule an appointment as soon as possible for a visit   [address]  [address] [facility] [address]  [address]  [phone]            Return to ED if worse     Procedures

## 2021-02-25 NOTE — ED TRIAGE NOTES
Triage Note: Patient complains of left abdominal pain since Sunday that has progressively gotten worse. Denies N/V. Denies urinary symptoms. Patient reports normal BM today. Patient also complains of a \"bump\" on his mid back that itches.

## 2021-02-26 NOTE — DISCHARGE INSTRUCTIONS
You have diverticulitis, which is an inflammation/infection of part your colon. Take oral antibiotics to treat this and follow-up with a GI specialist. Your creatinine, which is a measurement of your kidney function, was mildly elevated today. Increase your oral fluid intake and follow-up with your primary care physician in 1-2 weeks to repeat this lab work. Thank you for allowing us to provide you with medical care today. We realize that you have many choices for your emergency care needs. We thank you for choosing [facility]. Please choose us in the future for any continued health care needs. We hope we addressed all of your medical concerns. We strive to provide excellent quality care in the Emergency Department. Anything less than excellent does not meet our expectations. The exam and treatment you received in the Emergency Department were for an emergent problem and are not intended as complete care. It is important that you follow up with a doctor, nurse practitioner, or [phone] assistant for ongoing care. If your symptoms worsen or you do not improve as expected and you are unable to reach your usual health care provider, you should return to the Emergency Department. We are available 24 hours a day. Take this sheet with you when you go to your follow-up visit. If you have any problem arranging the follow-up visit, contact the Emergency Department immediately. Make an appointment your family doctor for follow up of this visit. Return to the ER if you are unable to be seen in a timely manner.

## 2021-08-31 ENCOUNTER — OFFICE VISIT (OUTPATIENT)
Dept: PRIMARY CARE CLINIC | Age: 46
End: 2021-08-31
Payer: MEDICAID

## 2021-08-31 VITALS
RESPIRATION RATE: 18 BRPM | DIASTOLIC BLOOD PRESSURE: 75 MMHG | HEIGHT: 68 IN | TEMPERATURE: 98.6 F | WEIGHT: 213.4 LBS | OXYGEN SATURATION: 96 % | HEART RATE: 83 BPM | BODY MASS INDEX: 32.34 KG/M2 | SYSTOLIC BLOOD PRESSURE: 117 MMHG

## 2021-08-31 DIAGNOSIS — R10.13 EPIGASTRIC PAIN: ICD-10-CM

## 2021-08-31 DIAGNOSIS — E11.9 DM TYPE 2, GOAL HBA1C < 7% (HCC): Primary | ICD-10-CM

## 2021-08-31 DIAGNOSIS — N18.9 CHRONIC KIDNEY DISEASE, UNSPECIFIED CKD STAGE: ICD-10-CM

## 2021-08-31 DIAGNOSIS — D36.9 DERMOID CYST: ICD-10-CM

## 2021-08-31 DIAGNOSIS — K57.90 DIVERTICULOSIS: ICD-10-CM

## 2021-08-31 PROCEDURE — 99214 OFFICE O/P EST MOD 30 MIN: CPT | Performed by: INTERNAL MEDICINE

## 2021-08-31 RX ORDER — PANTOPRAZOLE SODIUM 40 MG/1
40 TABLET, DELAYED RELEASE ORAL DAILY
Qty: 30 TABLET | Refills: 1 | Status: SHIPPED | OUTPATIENT
Start: 2021-08-31 | End: 2021-11-17

## 2021-08-31 NOTE — PROGRESS NOTES
Tho Ruiz is a 41 y.o.  male and presents with     Chief Complaint   Patient presents with    Establish Care    Labs     vomiting for 8 months- mostly in the a.m     Leg Pain     leg pain- swelling in foot and ankle     Pt used to see Lemuel Snyder  Pt has h/o DM, diverticulitis, cyst on back  Pt gets swelling in legs sometimes  Pt had surgery on his knee in the past.  Leg fives out sometimes  Pt gets dry heaves, mucus. Pt has GERD>  Pt works for [employer] and also works on a      Past Medical History:   Diagnosis Date    Abscess     Diabetes ([address]     History of high cholesterol     Low testosterone in male 2017     Past Surgical History:   Procedure Laterality Date    HX HEENT      Left Eye     Current Outpatient Medications   Medication Sig    pantoprazole (PROTONIX) 40 mg tablet Take 1 Tablet by mouth daily.  metFORMIN ER (GLUCOPHAGE XR) 500 mg tablet Take 1 Tab by mouth daily (with dinner). 1 x time daily with dinner    fenofibrate nanocrystallized (TRICOR) 145 mg tablet Take 1 Tab by mouth daily. Takes by mouth 1x daily with dinner. No current facility-administered medications for this visit. Health Maintenance   Topic Date Due    Hepatitis C Screening  Never done    Pneumococcal 0-64 years (1 of 2 - PPSV23) Never done    Foot Exam Q1  Never done    MICROALBUMIN Q1  Never done    Eye Exam Retinal or Dilated  Never done    DTaP/Tdap/Td series (1 - Tdap) Never done    Flu Vaccine (1) 09/01/2021    A1C test (Diabetic or Prediabetic)  12/16/2021    Lipid Screen  12/16/2021    COVID-19 Vaccine  Completed     Immunization History   Administered Date(s) Administered    COVID-19, PFIZER, MRNA, LNP-S, PF, 30MCG/0.3ML DOSE 08/06/2021, 08/27/2021    Influenza Vaccine (Quad) PF (>6 Mo Flulaval, Fluarix, and >3 Yrs [address] [place] [address], [facility] [id number]) 12/15/2020     No LMP for male patient.         Allergies and Intolerances:   No Known Allergies    Family History:   Family History Problem Relation Age of Onset    Diabetes Mother     Deep Vein Thrombosis Mother        Social History:   He  reports that he has been smoking. He has a 8.50 pack-year smoking history. He has never used smokeless tobacco.  He  reports current alcohol use of about 9.0 standard drinks of alcohol per week. Review of Systems:   General: negative for - chills, fatigue, fever, weight change  Psych: negative for - anxiety, depression, irritability or mood swings  ENT: negative for - headaches, hearing change, nasal congestion, oral lesions, sneezing or sore throat  Heme/ Lymph: negative for - bleeding problems, bruising, pallor or swollen lymph nodes  Endo: negative for - hot flashes, polydipsia/polyuria or temperature intolerance  Resp: negative for - cough, shortness of breath or wheezing  CV: negative for - chest pain, edema or palpitations  GI: negative for - abdominal pain, change in bowel habits, constipation, diarrhea or nausea/vomiting  : negative for - dysuria, hematuria, incontinence, pelvic pain or vulvar/vaginal symptoms  MSK: negative for - joint pain, joint swelling or muscle pain  Neuro: negative for - confusion, headaches, seizures or weakness  Derm: negative for - dry skin, hair changes, rash or skin lesion changes          Physical:   Vitals:   Vitals:    08/31/21 1542   BP: 117/75   Pulse: 83   Resp: 18   Temp: 98.6 °F (37 °C)   TempSrc: Temporal   SpO2: 96%   Weight: 213 lb 6.4 oz (96.8 kg)   Height: 5' 8\" (1.727 m)           Exam:   HEENT- atraumatic,normocephalic, awake, oriented, well nourished  Neck - supple,no enlarged lymph nodes, no JVD, no thyromegaly  Chest- CTA, no rhonchi, no crackles  Heart- rrr, no murmurs / gallop/rub  Abdomen- soft,BS+,NT, no hepatosplenomegaly,  Ext - no c/c/edema               Abd -  mild epigastric tenderness  Neuro- no focal deficits. Power 5/5 all extremities  Skin - warm,dry, no obvious rashes small cyst on the back,          Review of Data:   LABS: Lab Results   Component Value Date/Time    WBC 10.1 02/25/2021 06:35 PM    HGB 13.6 02/25/2021 06:35 PM    HCT 42.0 02/25/2021 06:35 PM    PLATELET 005 24/00/6867 06:35 PM     Lab Results   Component Value Date/Time    Sodium 140 02/25/2021 06:35 PM    Potassium 3.7 02/25/2021 06:35 PM    Chloride 104 02/25/2021 06:35 PM    CO2 25 02/25/2021 06:35 PM    Glucose 106 (H) 02/25/2021 06:35 PM    BUN 14 02/25/2021 06:35 PM    Creatinine 1.43 (H) 02/25/2021 06:35 PM     Lab Results   Component Value Date/Time    Cholesterol, total 228 (H) 12/16/2020 12:00 AM    HDL Cholesterol 52 12/16/2020 12:00 AM    LDL, calculated 126 (H) 12/16/2020 12:00 AM    Triglyceride 285 (H) 12/16/2020 12:00 AM     No components found for: GPT    Mild epigastric tenderness    Impression / Plan:        ICD-10-CM ICD-9-CM    1. DM type 2, goal HbA1c < 7% ([facility])  A84.4 187.84 METABOLIC PANEL, COMPREHENSIVE      CBC WITH AUTOMATED DIFF      LIPID PANEL      HEMOGLOBIN A1C WITH EAG      MICROALBUMIN, UR, RAND W/ MICROALB/CREAT RATIO   2. Diverticulosis  K57.90 562.10 REFERRAL TO GASTROENTEROLOGY   3. Chronic kidney disease, unspecified CKD stage  N18.9 585.9 URINALYSIS W/ RFLX MICROSCOPIC   4. Dermoid cyst  D36.9 229.9 REFERRAL TO GENERAL SURGERY   5. Epigastric pain  R10.13 789.06 pantoprazole (PROTONIX) 40 mg tablet      REFERRAL TO GASTROENTEROLOGY       Avoid NSAIDs. Explained to patient risk benefits of the medications. Advised patient to stop meds if having any side effects. Pt verbalized understanding of the instructions. I have discussed the diagnosis with the patient and the intended plan as seen in the above orders. The patient has received an after-visit summary and questions were answered concerning future plans. I have discussed medication side effects and warnings with the patient as well. I have reviewed the plan of care with the patient, accepted their input and they are in agreement with the treatment goals.      Reviewed plan of care. Patient has provided input and agrees with goals. Follow-up and Dispositions    · Return in about 3 months (around 11/30/2021).          Lex Sparks MD

## 2021-08-31 NOTE — PROGRESS NOTES
No chief complaint on file. Health Maintenance Due   Topic    Hepatitis C Screening     Pneumococcal 0-64 years (1 of 2 - PPSV23)    Foot Exam Q1     MICROALBUMIN Q1     Eye Exam Retinal or Dilated     COVID-19 Vaccine (1)    DTaP/Tdap/Td series (1 - Tdap)        1. Have you been to the ER, urgent care clinic since your last visit? Hospitalized since your last visit? No    2. Have you seen or consulted any other health care providers outside of the [address] [place] [address] since your last visit? Include any pap smears or colon screening.  No    Visit Vitals  Ht 5' 8\" (1.727 m)   Wt 213 lb 6.4 oz (96.8 kg)   BMI 32.45 kg/m²

## 2021-09-02 ENCOUNTER — TELEPHONE (OUTPATIENT)
Dept: SURGERY | Age: 46
End: 2021-09-02

## 2021-09-02 NOTE — TELEPHONE ENCOUNTER
Referred to: Raffy  Referred by: Terell Farah MD  Dx: Dermoid cyst    Called pt to schedule NP appt with Raffy. No answer and VM full so unable to LVM.

## 2021-09-10 ENCOUNTER — DOCUMENTATION ONLY (OUTPATIENT)
Dept: SURGERY | Age: 46
End: 2021-09-10

## 2021-09-10 NOTE — PROGRESS NOTES
Contacted patient to make him aware that he missed his appointment with Dr. Payton Mujica today.  Patient has been rescheduled to 9/15/2021 at 1:20pm.

## 2021-09-15 ENCOUNTER — OFFICE VISIT (OUTPATIENT)
Dept: SURGERY | Age: 46
End: 2021-09-15
Payer: MEDICAID

## 2021-09-15 VITALS
DIASTOLIC BLOOD PRESSURE: 82 MMHG | BODY MASS INDEX: 32.86 KG/M2 | HEART RATE: 81 BPM | RESPIRATION RATE: 18 BRPM | HEIGHT: 68 IN | OXYGEN SATURATION: 97 % | WEIGHT: 216.8 LBS | SYSTOLIC BLOOD PRESSURE: 120 MMHG | TEMPERATURE: 97.9 F

## 2021-09-15 DIAGNOSIS — L72.3 SEBACEOUS CYST: Primary | ICD-10-CM

## 2021-09-15 PROCEDURE — 99203 OFFICE O/P NEW LOW 30 MIN: CPT | Performed by: SURGERY

## 2021-09-15 NOTE — PROGRESS NOTES
General Surgery Office Consultation / H & P    CC: Back mass  History of Present Illness:      Gabrielle Wilkinson is a 41 y.o. male who presents with back mass. 50-year-old male presents with a chronic intermittent knee draining back mass. He says it drains after his wife squeezes it. Not painful. No erythema. Pain is 0 out of 10. No provoking or alleviating factors. No radiation. This has not happened anywhere else according to him. Here because of the bump under his skin. He is on Metformin. Past Medical History:   Diagnosis Date    Abscess     Diabetes ([address]     History of high cholesterol     Hypercholesterolemia     Low testosterone in male 2017     Past Surgical History:   Procedure Laterality Date    HX HEENT      Left Eye      Family History   Problem Relation Age of Onset    Diabetes Mother     Deep Vein Thrombosis Mother      Social History     Socioeconomic History    Marital status:      Spouse name: Not on file    Number of children: Not on file    Years of education: Not on file    Highest education level: Not on file   Tobacco Use    Smoking status: Current Every Day Smoker     Packs/day: 0.50     Years: 17.00     Pack years: 8.50    Smokeless tobacco: Never Used   Vaping Use    Vaping Use: Never used   Substance and Sexual Activity    Alcohol use: Yes     Alcohol/week: 9.0 standard drinks     Types: 6 Cans of beer, 3 Shots of liquor per week     Comment: occasionally 2-3 times a week    Drug use: Never    Sexual activity: Yes     Partners: Female     Birth control/protection: None     Social Determinants of Health     Financial Resource Strain:     Difficulty of Paying Living Expenses:    Food Insecurity:     Worried About Running Out of Food in the Last Year:     Ran Out of Food in the Last Year:    Transportation Needs:     Lack of Transportation (Medical):      Lack of Transportation (Non-Medical):    Physical Activity:     Days of Exercise per Week:  Minutes of Exercise per Session:    Stress:     Feeling of Stress :    Social Connections:     Frequency of Communication with Friends and Family:     Frequency of Social Gatherings with Friends and Family:     Attends Restorationist Services:     Active Member of Clubs or Organizations:     Attends Club or Organization Meetings:     Marital Status:       Prior to Admission medications    Medication Sig Start Date End Date Taking? Authorizing Provider   pantoprazole (PROTONIX) 40 mg tablet Take 1 Tablet by mouth daily. 8/31/21  Yes Christine Kirk MD   metFORMIN ER (GLUCOPHAGE XR) 500 mg tablet Take 1 Tab by mouth daily (with dinner). 1 x time daily with dinner 12/15/20  Yes Marissa Murphy NP   fenofibrate nanocrystallized (TRICOR) 145 mg tablet Take 1 Tab by mouth daily. Takes by mouth 1x daily with dinner.  12/15/20  Yes Gerhardt Burton, NP     No Known Allergies    Review of Systems:  Constitutional: No fever or chills  Neurologic: No headache  Eyes: No scleral icterus or irritated eyes  Nose: No nasal pain or drainage  Mouth: No oral lesions or sore throat  Cardiac: No palpations or chest pain  Pulmonary: No cough or shortness of breath  Gastrointestinal: No nausea, emesis, diarrhea, or constipation  Genitourinary: No dysuria  Musculoskeletal: No muscle or joint tenderness  Skin: Back bump  Psychiatric: No anxiety or depressed mood    Physical Exam:     Visit Vitals  /82   Pulse 81   Temp 97.9 °F (36.6 °C) (Oral)   Resp 18   Ht 5' 8\" (1.727 m)   Wt 216 lb 12.8 oz (98.3 kg)   SpO2 97%   BMI 32.96 kg/m²     General: No acute distress, conversant  Eyes: PERRLA, no scleral icterus  HENT: Normocephalic without oral lesions  Neck: Trachea midline without LAD  Cardiac: Normal pulse rate and rhythm  Pulmonary: Symmetric chest rise with normal effort  GI: Soft, NT, ND, no hernia, no splenomegaly  Skin: 1 cm paraspinal sebaceous cyst with black core  Extremities: No edema or joint stiffness  Psych: Appropriate mood and affect    Assessment:     25-year-old male with 1 cm sebaceous cyst on back    Plan:     1 cm sebaceous cyst on back. Recommend excision to treat the underlying problem. Not infected. This can be done in the office. Low risk of bleeding and infection. We will post the case at his convenience.       Signed By: Destiny uY MD  Bariatric and General Surgeon  [facility]    September 15, 2021

## 2021-09-15 NOTE — PROGRESS NOTES
1. Have you been to the ER, urgent care clinic since your last visit? Hospitalized since your last visit? no    2. Have you seen or consulted any other health care providers outside of the [address] [place] [address] since your last visit? Include any pap smears or colon screening.  no

## 2021-09-24 ENCOUNTER — HOSPITAL ENCOUNTER (EMERGENCY)
Age: 46
Discharge: HOME | End: 2021-09-24
Attending: EMERGENCY MEDICINE
Payer: MEDICAID

## 2021-09-24 VITALS
BODY MASS INDEX: 32.95 KG/M2 | DIASTOLIC BLOOD PRESSURE: 95 MMHG | WEIGHT: 216.71 LBS | RESPIRATION RATE: 16 BRPM | TEMPERATURE: 98.3 F | SYSTOLIC BLOOD PRESSURE: 137 MMHG | HEART RATE: 72 BPM | OXYGEN SATURATION: 98 %

## 2021-09-24 DIAGNOSIS — Z13.9 ENCOUNTER FOR MEDICAL SCREENING EXAMINATION: Primary | ICD-10-CM

## 2021-09-24 PROCEDURE — 99282 EMERGENCY DEPT VISIT SF MDM: CPT

## 2021-09-24 NOTE — DISCHARGE INSTRUCTIONS
Thank you for allowing us to provide you with medical care today. We realize that you have many choices for your emergency care needs. We thank you for choosing [facility]. Please choose us in the future for any continued health care needs. We hope we addressed all of your medical concerns. We strive to provide excellent quality care in the Emergency Department. Anything less than excellent does not meet our expectations. The exam and treatment you received in the Emergency Department were for an emergent problem and are not intended as complete care. It is important that you follow up with a doctor, nurse practitioner, or physician's assistant for ongoing care. If your symptoms worsen or you do not improve as expected and you are unable to reach your usual health care provider, you should return to the Emergency Department. We are available 24 hours a day. Take this sheet with you when you go to your follow-up visit. If you have any problem arranging the follow-up visit, contact the Emergency Department immediately. Make an appointment your family doctor for follow up of this visit. Return to the ER if you are unable to be seen in a timely manner.

## 2021-09-24 NOTE — Clinical Note
[facility]  [id number] [facility] [address]  [phone]    Work/School Note    Date: 9/24/2021    To Whom It May concern:      Ivan Rodriguez was seen and treated today in the emergency room by the following provider(s):  Attending Provider: Gena Rodriguez MD.      Ivan Rodriguez is excused from work/school on 09/24/21. He is clear to return to work/school on 09/25/21.         Sincerely,          Mikhail Rodney MD

## 2021-09-24 NOTE — ED PROVIDER NOTES
58-year-old gentleman history of diabetes, high cholesterol presents to the emergency department today requesting a work note. His wife was exposed to someone with Covid almost 3 weeks ago and patient was put on leave at work. Patient never developed symptoms and several days after exposure had a negative PCR Covid test. He denies any complaints today but he is requesting work note to return to work. The history is provided by the patient and medical records. This is a new problem. The problem has not changed since onset. The problem occurs constantly. Chief complaint is no cough, no diarrhea, no sore throat, no vomiting and no shortness of breath. Pertinent negatives include no fever, no abdominal pain, no diarrhea, no nausea, no vomiting, no headaches, no sore throat, no cough and no rash. Past Medical History:   Diagnosis Date    Abscess     Diabetes ([address]     History of high cholesterol     Hypercholesterolemia     Low testosterone in male 2017       Past Surgical History:   Procedure Laterality Date    HX HEENT      Left Eye         Family History:   Problem Relation Age of Onset    Diabetes Mother     Deep Vein Thrombosis Mother        Social History     Socioeconomic History    Marital status:      Spouse name: Not on file    Number of children: Not on file    Years of education: Not on file    Highest education level: Not on file   Occupational History    Not on file   Tobacco Use    Smoking status: Current Every Day Smoker     Packs/day: 0.50     Years: 17.00     Pack years: 8.50    Smokeless tobacco: Never Used   Vaping Use    Vaping Use: Never used   Substance and Sexual Activity    Alcohol use:  Yes     Alcohol/week: 9.0 standard drinks     Types: 6 Cans of beer, 3 Shots of liquor per week     Comment: occasionally 2-3 times a week    Drug use: Never    Sexual activity: Yes     Partners: Female     Birth control/protection: None   Other Topics Concern    Not on file   Social History Narrative    Not on file     Social Determinants of Health     Financial Resource Strain:     Difficulty of Paying Living Expenses:    Food Insecurity:     Worried About Running Out of Food in the Last Year:     [address] in the Last Year:    Transportation Needs:     Lack of Transportation (Medical):  Lack of Transportation (Non-Medical):    Physical Activity:     Days of Exercise per Week:     Minutes of Exercise per Session:    Stress:     Feeling of Stress :    Social Connections:     Frequency of Communication with Friends and Family:     Frequency of Social Gatherings with Friends and Family:     Attends Muslim Services:     Active Member of Clubs or Organizations:     Attends Club or Organization Meetings:     Marital Status:    Intimate Partner Violence:     Fear of Current or Ex-Partner:     Emotionally Abused:     Physically Abused:     Sexually Abused: ALLERGIES: Patient has no known allergies. Review of Systems   Constitutional: Negative for fatigue and fever. HENT: Negative for sneezing and sore throat. Respiratory: Negative for cough and shortness of breath. Cardiovascular: Negative for chest pain and leg swelling. Gastrointestinal: Negative for abdominal pain, diarrhea, nausea and vomiting. Genitourinary: Negative for difficulty urinating and dysuria. Musculoskeletal: Negative for arthralgias and myalgias. Skin: Negative for color change and rash. Neurological: Negative for weakness and headaches. Psychiatric/Behavioral: Negative for agitation and behavioral problems. Vitals:    09/24/21 1119   BP: (!) 137/95   Pulse: 72   Resp: 16   Temp: 98.3 °F (36.8 °C)   SpO2: 98%   Weight: 98.3 kg (216 lb 11.4 oz)            Physical Exam  Vitals and nursing note reviewed. Constitutional:       General: He is not in acute distress. Appearance: Normal appearance. He is well-developed. He is obese. He is not ill-appearing, toxic-appearing or diaphoretic. HENT:      Head: Normocephalic and atraumatic. Nose: Nose normal.      Mouth/Throat:      Mouth: Mucous membranes are moist.      Pharynx: Oropharynx is clear. Eyes:      Extraocular Movements: Extraocular movements intact. Conjunctiva/sclera: Conjunctivae normal.      Pupils: Pupils are equal, round, and reactive to light. Cardiovascular:      Rate and Rhythm: Normal rate and regular rhythm. Pulses: Normal pulses. Heart sounds: No murmur heard. Pulmonary:      Effort: Pulmonary effort is normal. No respiratory distress. Breath sounds: Normal breath sounds. No wheezing. Chest:      Chest wall: No mass or tenderness. Abdominal:      General: There is no distension. Palpations: Abdomen is soft. Tenderness: There is no abdominal tenderness. There is no guarding or rebound. Musculoskeletal:         General: No swelling, tenderness, deformity or signs of injury. Normal range of motion. Cervical back: Normal range of motion and neck supple. No rigidity. No muscular tenderness. Right lower leg: No tenderness. No edema. Left lower leg: No tenderness. No edema. Skin:     General: Skin is warm and dry. Capillary Refill: Capillary refill takes less than 2 seconds. Neurological:      General: No focal deficit present. Mental Status: He is alert and oriented to person, place, and time. Psychiatric:         Mood and Affect: Mood normal.         Behavior: Behavior normal.          MDM  Number of Diagnoses or Management Options  Diagnosis management comments: 43-year-old male presents as above for a work note. He had a second order potential exposure to Covid with a negative testing and no symptoms. At this point he is far enough out from quarantine that he may return to work.          Procedures

## 2021-09-24 NOTE — ED TRIAGE NOTES
Triage Note: Patient arrives to ER for a note to return to work. Patient states his wife was exposed to [place] 9/7/21. He tested negative 9/10/21, and quarantined but states he cannot go back without a note. Denies any symptoms, states he feels well.

## 2021-10-22 ENCOUNTER — TELEPHONE (OUTPATIENT)
Dept: SURGERY | Age: 46
End: 2021-10-22

## 2021-10-22 NOTE — TELEPHONE ENCOUNTER
Wife called in looking to schedule her  for his in office procedure with Dr. Jamarcus Yang for excision of a sebaceous cyst.  CB: [phone]

## 2021-11-09 ENCOUNTER — HOSPITAL ENCOUNTER (OUTPATIENT)
Dept: LAB | Age: 46
Discharge: HOME | End: 2021-11-09

## 2021-11-09 ENCOUNTER — OFFICE VISIT (OUTPATIENT)
Dept: SURGERY | Age: 46
End: 2021-11-09
Payer: MEDICAID

## 2021-11-09 ENCOUNTER — TELEPHONE (OUTPATIENT)
Dept: SURGERY | Age: 46
End: 2021-11-09

## 2021-11-09 VITALS
TEMPERATURE: 98.1 F | WEIGHT: 216 LBS | SYSTOLIC BLOOD PRESSURE: 119 MMHG | HEART RATE: 68 BPM | HEIGHT: 68 IN | BODY MASS INDEX: 32.74 KG/M2 | DIASTOLIC BLOOD PRESSURE: 88 MMHG | OXYGEN SATURATION: 100 %

## 2021-11-09 DIAGNOSIS — L72.0 EPIDERMOID CYST OF SKIN OF BACK: Primary | ICD-10-CM

## 2021-11-09 PROCEDURE — 12032 INTMD RPR S/A/T/EXT 2.6-7.5: CPT | Performed by: SURGERY

## 2021-11-09 PROCEDURE — 11403 EXC TR-EXT B9+MARG 2.1-3CM: CPT | Performed by: SURGERY

## 2021-11-09 NOTE — TELEPHONE ENCOUNTER
Patient called stating that he had a cyst removed today but was not prescribed any antibiotics to prevent infection. He wants to know if he needs them. Please advise.

## 2021-11-09 NOTE — PROGRESS NOTES
PROCEDURE NOTE    Date of Procedure: 11/9/21    Preoperative Diagnosis: Back sebaceous cyst  Postoperative Diagnosis: same    Procedure: Excision of sebaceous cyst back    Surgeon: Alana Osullivan MD    Assistant(s): Nurse Valery      Anesthesia: 10 mL 1% lidocaine with 1/100,000 epinephrine    Indications: 41 y/o M with painful cyst of back    Findings: Non infected 1 cm cyst    Description of Operation: Adolph Chambers was identified. Informed consent was obtained after a complete discussion of risks, benefits and alternatives to surgery were had with the patient. The patient was then prepped and draped in the usual sterile fashion. The patient was injected with local anesthesia. I made a 1 x 3 cm elliptical incision around the cyst.  I used a 15 blade to make this incision and also to dissected down until I reached the subcutaneous tissue. I noted the cyst capsule which I excised in entirety without entering. I passed the 1 x 3 cm specimen off the field. I then used multiple interrupted 3-0 Vicryl followed by 4-0 Monocryl to close the deep dermal layers and subcutaneous layer respectively. I applied Dermabond. At the end of the procedure all instrument, needle, and sponge counts were correct. Patient was sent home in stable condition.       Estimated Blood Loss: 4 mL    Specimens: Back cyst    Complications: None    Implants: None      Alana Osullivan MD  Bariatric and General Surgeon  [facility]  11/9/2021

## 2021-11-09 NOTE — PROGRESS NOTES
1. Have you been to the ER, urgent care clinic since your last visit? Hospitalized since your last visit? No    2. Have you seen or consulted any other health care providers outside of the [address] [place] [address] since your last visit? Include any pap smears or colon screening.  No

## 2021-11-17 DIAGNOSIS — R10.13 EPIGASTRIC PAIN: ICD-10-CM

## 2021-11-17 RX ORDER — PANTOPRAZOLE SODIUM 40 MG/1
TABLET, DELAYED RELEASE ORAL
Qty: 30 TABLET | Refills: 0 | Status: SHIPPED | OUTPATIENT
Start: 2021-11-17

## 2021-11-22 ENCOUNTER — VIRTUAL VISIT (OUTPATIENT)
Dept: SURGERY | Age: 46
End: 2021-11-22
Payer: MEDICAID

## 2021-11-22 VITALS — WEIGHT: 213 LBS | HEIGHT: 68 IN | BODY MASS INDEX: 32.28 KG/M2

## 2021-11-22 DIAGNOSIS — Z09 POSTOPERATIVE EXAMINATION: Primary | ICD-10-CM

## 2021-11-22 PROCEDURE — 99024 POSTOP FOLLOW-UP VISIT: CPT | Performed by: SURGERY

## 2021-11-22 NOTE — PROGRESS NOTES
Surgery Progress Note    11/22/2021    CC: Postoperative state    Subjective:     Patient doing well after excision of sebaceous cyst.  Some itchiness. No drainage anymore. No concerns. Constitutional: No fever or chills  Neurologic: No headache  Eyes: No scleral icterus or irritated eyes  Nose: No nasal pain or drainage  Mouth: No oral lesions or sore throat  Cardiac: No palpations or chest pain  Pulmonary: No cough or shortness of breath  Gastrointestinal: No nausea, emesis, diarrhea, or constipation  Genitourinary: No dysuria  Musculoskeletal: No muscle or joint tenderness  Skin: No rashes or lesions  Psychiatric: No anxiety or depressed mood      Consent:  The patient and/or their healthcare decision maker is aware that this patient-initiated Telehealth encounter is a billable service, with coverage as determined by the patient's insurance carrier. They are aware that they may receive a bill and has provided verbal consent to proceed: Yes     This virtual visit was conducted via [employer]. Pursuant to the emergency declaration under the [facility] waiver authority and the [employer] Act, this Virtual  Visit was conducted to reduce the patient's risk of exposure to COVID-19 and provide continuity of care for an established patient. Services were provided through a video synchronous discussion virtually to substitute for in-person clinic visit. Due to this being a TeleHealth evaluation, many elements of the physical examination are unable to be assessed.      Objective:     Visit Vitals  Ht 5' 8\" (1.727 m)   Wt 213 lb (96.6 kg)   BMI 32.39 kg/m²       General: No acute distress, conversant    Assessment:     Patient doing well after excision of back cyst    Plan:     Pathology reviewedbenign  No current concerns  Can follow-up as needed    Ting Blackwell MD  Bariatric and General Surgeon  [facility]

## 2021-11-22 NOTE — PROGRESS NOTES
1. Have you been to the ER, urgent care clinic since your last visit? Hospitalized since your last visit? No    2. Have you seen or consulted any other health care providers outside of the [address] [place] [address] since your last visit? Include any pap smears or colon screening.  No

## 2022-01-03 ENCOUNTER — HOSPITAL ENCOUNTER (EMERGENCY)
Age: 47
Discharge: HOME | End: 2022-01-03
Attending: EMERGENCY MEDICINE
Payer: MEDICAID

## 2022-01-03 VITALS
WEIGHT: 215 LBS | HEIGHT: 68 IN | RESPIRATION RATE: 20 BRPM | DIASTOLIC BLOOD PRESSURE: 85 MMHG | HEART RATE: 76 BPM | OXYGEN SATURATION: 97 % | BODY MASS INDEX: 32.58 KG/M2 | TEMPERATURE: 98.4 F | SYSTOLIC BLOOD PRESSURE: 116 MMHG

## 2022-01-03 DIAGNOSIS — R10.30 LOWER ABDOMINAL PAIN: ICD-10-CM

## 2022-01-03 DIAGNOSIS — B34.9 VIRAL SYNDROME: Primary | ICD-10-CM

## 2022-01-03 LAB
GLUCOSE BLD STRIP.AUTO-MCNC: 151 MG/DL (ref 65–117)
SARS-COV-2, COV2: NORMAL
SERVICE CMNT-IMP: ABNORMAL

## 2022-01-03 PROCEDURE — 99283 EMERGENCY DEPT VISIT LOW MDM: CPT

## 2022-01-03 PROCEDURE — U0005 INFEC AGEN DETEC AMPLI PROBE: HCPCS

## 2022-01-03 PROCEDURE — 82962 GLUCOSE BLOOD TEST: CPT

## 2022-01-03 RX ORDER — NAPROXEN 500 MG/1
500 TABLET ORAL 2 TIMES DAILY WITH MEALS
Qty: 20 TABLET | Refills: 0 | Status: SHIPPED | OUTPATIENT
Start: 2022-01-03 | End: 2022-01-03 | Stop reason: SDUPTHER

## 2022-01-03 RX ORDER — ONDANSETRON 4 MG/1
4 TABLET, ORALLY DISINTEGRATING ORAL
Qty: 20 TABLET | Refills: 0 | Status: SHIPPED | OUTPATIENT
Start: 2022-01-03 | End: 2022-08-26

## 2022-01-03 RX ORDER — DICYCLOMINE HYDROCHLORIDE 10 MG/1
10 CAPSULE ORAL
Qty: 30 CAPSULE | Refills: 0 | Status: SHIPPED | OUTPATIENT
Start: 2022-01-03

## 2022-01-03 RX ORDER — NAPROXEN 500 MG/1
500 TABLET ORAL 2 TIMES DAILY WITH MEALS
Qty: 20 TABLET | Refills: 0 | Status: SHIPPED | OUTPATIENT
Start: 2022-01-03 | End: 2022-01-13

## 2022-01-03 NOTE — Clinical Note
[facility] EMERGENCY CTR  [address]  [address]  [phone]    Work/School Note    Date: 1/3/2022     To Whom It May concern:    Christina Mckeon was evaluated by the following provider(s):  Attending Provider: Easton Carbajal [address] [place] [address] virus is suspected. Per the CDC guidelines we recommend home isolation until the following conditions are all met:    1. At least five days have passed since symptoms first appeared and/or had a close exposure,   2. After home isolation for five days, wearing a mask around others for the next five days,  3. At least 24 have passed since last fever without the use of fever-reducing medications and  4.  Symptoms (eg cough, shortness of breath) have improved      Sincerely,          Fletcher Quintero MD
[facility] EMER[facility]ENC[facility]  [address]  [address]  [phone]    Work/School Note    Date: 1/3/2022     To Whom It May concern:    Graciela Jackson was evaluated by the following provider(s):  Attending Provider: Sophie Whitney [address] [place] [address] virus is suspected. Per the CDC guidelines we recommend home isolation until the following conditions are all met:    1. At least five days have passed since symptoms first appeared and/or had a close exposure,   2. After home isolation for five days, wearing a mask around others for the next five days,  3. At least 24 have passed since last fever without the use of fever-reducing medications and  4.  Symptoms (eg cough, shortness of breath) have improved      Sincerely,          Leisa Santana MD
no

## 2022-01-04 ENCOUNTER — VIRTUAL VISIT (OUTPATIENT)
Dept: PRIMARY CARE CLINIC | Age: 47
End: 2022-01-04
Payer: MEDICAID

## 2022-01-04 DIAGNOSIS — U07.1 COVID-19: Primary | ICD-10-CM

## 2022-01-04 LAB
SARS-COV-2, XPLCVT: DETECTED
SOURCE, COVRS: ABNORMAL

## 2022-01-04 PROCEDURE — 99213 OFFICE O/P EST LOW 20 MIN: CPT | Performed by: INTERNAL MEDICINE

## 2022-01-04 NOTE — ED NOTES
Discharge note: The patient was discharged home in stable condition, accompanied by family member. The patient is alert and oriented, is in no respiratory distress and has vital signs within normal limits. The patient's diagnosis, condition and treatment were explained to patient by Dr Kan Whiteside and reinforced by nurse. The patient and family party expressed understanding of discharge instructions, prescriptions, and plan of care. A work note was given to pt. A discharge plan has been developed. A  was not involved in the process. Patient offered a wheelchair to ED lob for discharge but declined at this time. Patient ambulatory to ED lobby to go home with family member.

## 2022-01-04 NOTE — DISCHARGE INSTRUCTIONS
Please return to the emergency department for worsening pain, nausea vomiting, blood in the stools or any other concern.

## 2022-01-04 NOTE — ED TRIAGE NOTES
Patient arrives from home with CC of abdominal pain for 2-3 days accompanied by a headache. He stated that he had a little bit of diarrhea \"for a day or two but not much\" and it has currently resolved.

## 2022-01-05 NOTE — PROGRESS NOTES
Selam Mckeon is a 40 y.o. male who was seen by synchronous (real-time) audio-video technology on 1/4/2022 for Positive For Covid-19        Assessment & Plan:   Diagnoses and all orders for this visit:    1. COVID-19      Asked patient to quarantine himself, wear mask. Monitor heart rate, oxygen saturation, and temperature. he may need to go to urgent care if his symptoms get worse. Asked patient to keep follow-up appointment on January 12 in the office  Asked patient to send [place] paperwork or bring it for his next visit    Subjective:       Prior to Admission medications    Medication Sig Start Date End Date Taking? Authorizing Provider   ondansetron (ZOFRAN ODT) 4 mg disintegrating tablet Take 1 Tablet by mouth every eight (8) hours as needed for Nausea or Vomiting. 1/3/22   Chel Navarrete MD   dicyclomine (BENTYL) 10 mg capsule Take 1 Capsule by mouth every six (6) hours as needed for Abdominal Cramps or Cramping. 1/3/22   Chel Navarrete MD   naproxen (Naprosyn) 500 mg tablet Take 1 Tablet by mouth two (2) times daily (with meals) for 10 days. 1/3/22 1/13/22  Chel Navarrete MD   pantoprazole (PROTONIX) 40 mg tablet TAKE ONE TABLET BY MOUTH ONCE DAILY. 11/17/21   Abigail Landeros MD   metFORMIN ER (GLUCOPHAGE XR) 500 mg tablet Take 1 Tab by mouth daily (with dinner). 1 x time daily with dinner 12/15/20   Miladis Murphy NP   fenofibrate nanocrystallized (TRICOR) 145 mg tablet Take 1 Tab by mouth daily. Takes by mouth 1x daily with dinner. 12/15/20   Delvis Killian NP     HIstory    Patient was seen in the emergency room yesterday for symptoms of headache and diarrhea and loss of smell. He was positive for COVID-19 infection. He denies any shortness of breath. He has mild cough. His wife was diagnosed next December 29. He works for [employer]. He was asked to quarantine himself emergency room. He has appointment in the office.       ROS    Objective: Patient-Reported Vitals 11/22/2021   Patient-Reported Weight 213lb        [INSTRUCTIONS:  \"[x]\" Indicates a positive item  \"[]\" Indicates a negative item  -- DELETE ALL ITEMS NOT EXAMINED]    Constitutional: [x] Appears well-developed and well-nourished [x] No apparent distress      [] Abnormal -     Mental status: [x] Alert and awake  [x] Oriented to person/place/time [x] Able to follow commands    [] Abnormal -     Eyes:   EOM    [x]  Normal    [] Abnormal -   Sclera  [x]  Normal    [] Abnormal -          Discharge [x]  None visible   [] Abnormal -     HENT: [x] Normocephalic, atraumatic  [] Abnormal -   [x] Mouth/Throat: Mucous membranes are moist    External Ears [x] Normal  [] Abnormal -    Neck: [x] No visualized mass [] Abnormal -     Pulmonary/Chest: [x] Respiratory effort normal   [x] No visualized signs of difficulty breathing or respiratory distress        [] Abnormal -      Musculoskeletal:   [x] Normal gait with no signs of ataxia         [x] Normal range of motion of neck        [] Abnormal -     Neurological:        [x] No Facial Asymmetry (Cranial nerve 7 motor function) (limited exam due to video visit)          [x] No gaze palsy        [] Abnormal -          Skin:        [x] No significant exanthematous lesions or discoloration noted on facial skin         [] Abnormal -            Psychiatric:       [x] Normal Affect [] Abnormal -        [x] No Hallucinations    Other pertinent observable physical exam findings:-        We discussed the expected course, resolution and complications of the diagnosis(es) in detail. Medication risks, benefits, costs, interactions, and alternatives were discussed as indicated. I advised him to contact the office if his condition worsens, changes or fails to improve as anticipated. He expressed understanding with the diagnosis(es) and plan. Oscar Ruffin, was evaluated through a synchronous (real-time) audio-video encounter.  The patient (or guardian if applicable) is aware that this is a billable service. Verbal consent to proceed has been obtained within the past 12 months. The visit was conducted pursuant to the emergency declaration under the [address] waiver authority and the [employer] and [employer] General Act. Patient identification was verified, and a caregiver was present when appropriate. The patient was located in a state where the provider was credentialed to provide care.       Roxie Pappas MD

## 2022-01-05 NOTE — ED PROVIDER NOTES
Patient is a 52-year-old male with past medical history significant for hyperlipidemia and diabetes who presents emergency department for evaluation of lower abdominal pain and headache for the past couple days. He states initially it was more severe but seems to be improving. He states briefly he was having a hard time keeping down fluids but has been able to do so since and actually had a meal today. Also notes some headache. States his main concern was Covid. He states in the past he was diagnosed with an episode of mild diverticulitis but states this does not feel anywhere near as painful as that. Past Medical History:   Diagnosis Date    Abscess     Diabetes ([address]     History of high cholesterol     Hypercholesterolemia     Low testosterone in male 2017       Past Surgical History:   Procedure Laterality Date    HX HEENT      Left Eye         Family History:   Problem Relation Age of Onset    Diabetes Mother     Deep Vein Thrombosis Mother        Social History     Socioeconomic History    Marital status:      Spouse name: Not on file    Number of children: Not on file    Years of education: Not on file    Highest education level: Not on file   Occupational History    Not on file   Tobacco Use    Smoking status: Current Every Day Smoker     Packs/day: 0.50     Years: 17.00     Pack years: 8.50    Smokeless tobacco: Never Used   Vaping Use    Vaping Use: Never used   Substance and Sexual Activity    Alcohol use:  Yes     Alcohol/week: 9.0 standard drinks     Types: 6 Cans of beer, 3 Shots of liquor per week     Comment: occasionally 2-3 times a week    Drug use: Never    Sexual activity: Yes     Partners: Female     Birth control/protection: None   Other Topics Concern    Not on file   Social History Narrative    Not on file     Social Determinants of Health     Financial Resource Strain:     Difficulty of Paying Living Expenses: Not on file   Food Insecurity:     Worried About [facility] in the Last Year: Not on file    Zac of Food in the Last Year: Not on file   Transportation Needs:     Lack of Transportation (Medical): Not on file    Lack of Transportation (Non-Medical): Not on file   Physical Activity:     Days of Exercise per Week: Not on file    Minutes of Exercise per Session: Not on file   Stress:     Feeling of Stress : Not on file   Social Connections:     Frequency of Communication with Friends and Family: Not on file    Frequency of Social Gatherings with Friends and Family: Not on file    Attends Ten[employer] Services: Not on file    Active Member of [address] [place] [address] or Organizations: Not on file    Attends Club or Organization Meetings: Not on file    Marital Status: Not on file   Intimate Partner Violence:     Fear of Current or Ex-Partner: Not on file    Emotionally Abused: Not on file    Physically Abused: Not on file    Sexually Abused: Not on file   Housing Stability:     Unable to Pay for Housing in the Last Year: Not on file    Number of [place] in the Last Year: Not on file    Unstable Housing in the Last Year: Not on file         ALLERGIES: Patient has no known allergies. Review of Systems   Constitutional: Positive for chills and fatigue. HENT: Negative for drooling. Eyes: Negative for photophobia. Respiratory: Negative for shortness of breath. Cardiovascular: Negative for chest pain. Gastrointestinal: Positive for abdominal pain, rectal pain and vomiting. Negative for blood in stool. Genitourinary: Negative for dysuria and hematuria. Musculoskeletal: Positive for myalgias. Negative for neck pain. Skin: Negative for rash. Neurological: Negative for seizures and syncope. Hematological: Does not bruise/bleed easily. Psychiatric/Behavioral: Negative.         Vitals:    01/03/22 2008   BP: 116/85   Pulse: 76   Resp: 20   Temp: 98.4 °F (36.9 °C)   SpO2: 97%   Weight: 97.5 kg (215 lb)   Height: 5' 8\" (1.727 m)            Physical Exam  Vitals and nursing note reviewed. Constitutional:       Appearance: He is not toxic-appearing or diaphoretic. HENT:      Head: Normocephalic and atraumatic. Cardiovascular:      Rate and Rhythm: Normal rate. Heart sounds: Normal heart sounds. No friction rub. Pulmonary:      Effort: Pulmonary effort is normal.      Breath sounds: Normal breath sounds. Abdominal:      Palpations: Abdomen is soft. Tenderness: There is abdominal tenderness (Minimal) in the right lower quadrant and left lower quadrant. There is no guarding or rebound. Negative signs include Fernandez's sign and Rovsing's sign. Skin:     General: Skin is warm and dry. Neurological:      Mental Status: He is alert and oriented to person, place, and time. Psychiatric:         Mood and Affect: Mood normal.         Behavior: Behavior normal.          MDM  Number of Diagnoses or Management Options  Lower abdominal pain  Viral syndrome  Diagnosis management comments: Patient declines any blood work or CT scanning at this time. Stating that he was just presenting really for Covid test.  States he feels as though his symptoms are improving and not anywhere near as painful as when he had diverticulitis. Patient given strict warning signs. And symptoms return advise close follow-up.          Procedures

## 2022-01-11 ENCOUNTER — TELEPHONE (OUTPATIENT)
Dept: PRIMARY CARE CLINIC | Age: 47
End: 2022-01-11

## 2022-01-11 NOTE — TELEPHONE ENCOUNTER
Spoke with patient's wife advised her that I will be switching their appointment to virtual due to them both still experiencing covid symptoms

## 2022-01-11 NOTE — TELEPHONE ENCOUNTER
----- Message from Gilmer Woodruff sent at 1/11/2022  8:52 AM EST -----  Subject: Referral Request    QUESTIONS   Reason for referral request? Covid test to be retested to go back to work   Has the physician seen you for this condition before? No   Preferred Specialist (if applicable)? Do you already have an appointment scheduled? No  Additional Information for Provider? Patients wife called to request if a   [facility] [place] [facility] to be able to   go back to work. Patients pcp is Dr. Aron Pitts Please advise.  ---------------------------------------------------------------------------  --------------  Yaw GIL  What is the best way for the office to contact you? OK to leave message on   voicemail  Preferred Call Back Phone Number?  [phone]

## 2022-01-12 ENCOUNTER — OFFICE VISIT (OUTPATIENT)
Dept: PRIMARY CARE CLINIC | Age: 47
End: 2022-01-12
Payer: MEDICAID

## 2022-01-12 VITALS
BODY MASS INDEX: 33.95 KG/M2 | HEART RATE: 83 BPM | TEMPERATURE: 97.8 F | SYSTOLIC BLOOD PRESSURE: 133 MMHG | HEIGHT: 68 IN | OXYGEN SATURATION: 96 % | DIASTOLIC BLOOD PRESSURE: 85 MMHG | WEIGHT: 224 LBS | RESPIRATION RATE: 20 BRPM

## 2022-01-12 DIAGNOSIS — U07.1 COVID-19: Primary | ICD-10-CM

## 2022-01-12 DIAGNOSIS — E11.9 DM TYPE 2, GOAL HBA1C < 7% (HCC): ICD-10-CM

## 2022-01-12 PROCEDURE — 99213 OFFICE O/P EST LOW 20 MIN: CPT | Performed by: INTERNAL MEDICINE

## 2022-01-12 NOTE — PROGRESS NOTES
Nataliya Arreola is a 40 y.o.  male and presents with     Chief Complaint   Patient presents with    Letter for School/Work     Note to return to work, Patient have results from [facility] Negative     Pt was tested for COVID 19 on the 3rd and tested pos. Pt has been doing quarantine. Wife tested pos on Dec 29th. Pt had mild symptoms. Pt works for [employer]  He had repeat testing that was neg for COVDI 19 on the 11th. Pt feels fine and wants to go back on the 14th. He reports that he is doing fine as far as diabetes concern and taking medications as directed      Past Medical History:   Diagnosis Date    Abscess     Diabetes ([facility] [address]     History of high cholesterol     Hypercholesterolemia     Low testosterone in male 2017     Past Surgical History:   Procedure Laterality Date    HX HEENT      Left Eye     Current Outpatient Medications   Medication Sig    naproxen (Naprosyn) 500 mg tablet Take 1 Tablet by mouth two (2) times daily (with meals) for 10 days.  ondansetron (ZOFRAN ODT) 4 mg disintegrating tablet Take 1 Tablet by mouth every eight (8) hours as needed for Nausea or Vomiting.  dicyclomine (BENTYL) 10 mg capsule Take 1 Capsule by mouth every six (6) hours as needed for Abdominal Cramps or Cramping.  pantoprazole (PROTONIX) 40 mg tablet TAKE ONE TABLET BY MOUTH ONCE DAILY.  metFORMIN ER (GLUCOPHAGE XR) 500 mg tablet Take 1 Tab by mouth daily (with dinner). 1 x time daily with dinner    fenofibrate nanocrystallized (TRICOR) 145 mg tablet Take 1 Tab by mouth daily. Takes by mouth 1x daily with dinner. No current facility-administered medications for this visit.      Health Maintenance   Topic Date Due    Hepatitis C Screening  Never done    Pneumococcal 0-64 years (1 of 2 - PPSV23) Never done    DTaP/Tdap/Td series (1 - Tdap) Never done    Flu Vaccine (1) 09/01/2021    Colorectal Cancer Screening Combo  Never done    COVID-19 Vaccine (3 - Booster for Pfizer series) 02/27/2022    Lipid Spoke with the  at the Dr. Cedillo office.  Dr. Anguiano would like to discuss pt care with Dr. Celis and  will call me back   Screen  01/12/2027     Immunization History   Administered Date(s) Administered    COVID-19, PFIZER, MRNA, LNP-S, PF, 30MCG/0.3ML DOSE 08/06/2021, 08/27/2021    Influenza Vaccine (Quad) PF (>6 Mo Flulaval, Fluarix, and >3 Yrs Afluria, Fluzone 59051) 12/15/2020     No LMP for male patient. Allergies and Intolerances:   No Known Allergies    Family History:   Family History   Problem Relation Age of Onset    Diabetes Mother     Deep Vein Thrombosis Mother        Social History:   He  reports that he has been smoking. He has a 8.50 pack-year smoking history. He has never used smokeless tobacco.  He  reports current alcohol use of about 9.0 standard drinks of alcohol per week.             Review of Systems:   General: negative for - chills, fatigue, fever, weight change  Psych: negative for - anxiety, depression, irritability or mood swings  ENT: negative for - headaches, hearing change, nasal congestion, oral lesions, sneezing or sore throat  Heme/ Lymph: negative for - bleeding problems, bruising, pallor or swollen lymph nodes  Endo: negative for - hot flashes, polydipsia/polyuria or temperature intolerance  Resp: negative for - cough, shortness of breath or wheezing  CV: negative for - chest pain, edema or palpitations  GI: negative for - abdominal pain, change in bowel habits, constipation, diarrhea or nausea/vomiting  : negative for - dysuria, hematuria, incontinence, pelvic pain or vulvar/vaginal symptoms  MSK: negative for - joint pain, joint swelling or muscle pain  Neuro: negative for - confusion, headaches, seizures or weakness  Derm: negative for - dry skin, hair changes, rash or skin lesion changes          Physical:   Vitals:   Vitals:    01/12/22 1118   BP: 133/85   Pulse: 83   Resp: 20   Temp: 97.8 °F (36.6 °C)   TempSrc: Temporal   SpO2: 96%   Weight: 224 lb (101.6 kg)   Height: 5' 8\" (1.727 m)           Exam:   HEENT- atraumatic,normocephalic, awake, oriented, well nourished  Neck - supple,no enlarged lymph nodes, no JVD, no thyromegaly  Chest- CTA, no rhonchi, no crackles  Heart- rrr, no murmurs / gallop/rub  Abdomen- soft,BS+,NT, no hepatosplenomegaly  Ext - no c/c/edema   Neuro- no focal deficits. Power 5/5 all extremities  Skin - warm,dry, no obvious rashes. Review of Data:   LABS:   Lab Results   Component Value Date/Time    WBC 10.1 02/25/2021 06:35 PM    HGB 13.6 02/25/2021 06:35 PM    HCT 42.0 02/25/2021 06:35 PM    PLATELET 550 34/07/9311 06:35 PM     Lab Results   Component Value Date/Time    Sodium 140 02/25/2021 06:35 PM    Potassium 3.7 02/25/2021 06:35 PM    Chloride 104 02/25/2021 06:35 PM    CO2 25 02/25/2021 06:35 PM    Glucose 106 (H) 02/25/2021 06:35 PM    BUN 14 02/25/2021 06:35 PM    Creatinine 1.43 (H) 02/25/2021 06:35 PM     Lab Results   Component Value Date/Time    Cholesterol, total 228 (H) 12/16/2020 12:00 AM    HDL Cholesterol 52 12/16/2020 12:00 AM    LDL, calculated 126 (H) 12/16/2020 12:00 AM    Triglyceride 285 (H) 12/16/2020 12:00 AM     No components found for: GPT        Impression / Plan:        ICD-10-CM ICD-9-CM    1. COVID-19  U07.1 079.89    2. DM type 2, goal HbA1c < 7% (HCC)  E11.9 250.00 HEMOGLOBIN A1C WITH EAG      LIPID PANEL      METABOLIC PANEL, COMPREHENSIVE      MICROALBUMIN, UR, RAND W/ MICROALB/CREAT RATIO     COVID-19 infection-patient symptoms have resolved, repeat COVID-19 test is negative. Paperwork to return back to work was done and given to the patient. Explained to patient risk benefits of the medications. Advised patient to stop meds if having any side effects. Pt verbalized understanding of the instructions. I have discussed the diagnosis with the patient and the intended plan as seen in the above orders. The patient has received an after-visit summary and questions were answered concerning future plans. I have discussed medication side effects and warnings with the patient as well.  I have reviewed the plan of care with the patient, accepted their input and they are in agreement with the treatment goals. Reviewed plan of care. Patient has provided input and agrees with goals. Follow-up and Dispositions    · Return in about 1 month (around 2/12/2022).          Kian Seymour MD

## 2022-01-12 NOTE — LETTER
NOTIFICATION RETURN TO WORK / SCHOOL    1/12/2022 11:51 AM    Mr. Maritza Silva [address] [address]      To Whom It May Concern:    Veena Boles is currently under the care of Elvira Grant. Patient tested positive for COVID 19 infection January 3,2022    He will return to work/school on: 1/14/2022    Repeat test done on  1/11/2022 was negative for  COVID 19 infection . If there are questions or concerns please have the patient contact our office.         Sincerely,      Alicia Palumbo MD

## 2022-01-12 NOTE — PROGRESS NOTES
Chief Complaint   Patient presents with    Letter for School/Work     Note to return to work, Patient have results from [facility] Negative        Visit Vitals  /85 (BP 1 Location: Right upper arm, BP Patient Position: Sitting)   Pulse 83   Temp 97.8 °F (36.6 °C) (Temporal)   Resp 20   Ht 5' 8\" (1.727 m)   Wt 224 lb (101.6 kg)   SpO2 96%   BMI 34.06 kg/m²        1. Have you been to the ER, urgent care clinic since your last visit? Hospitalized since your last visit? No    2. Have you seen or consulted any other health care providers outside of the [address] [place] [address] since your last visit? Include any pap smears or colon screening.  No

## 2022-08-26 ENCOUNTER — HOSPITAL ENCOUNTER (EMERGENCY)
Age: 47
Discharge: HOME | End: 2022-08-26
Attending: EMERGENCY MEDICINE
Payer: MEDICAID

## 2022-08-26 ENCOUNTER — APPOINTMENT (OUTPATIENT)
Dept: CT IMAGING | Age: 47
End: 2022-08-26
Attending: EMERGENCY MEDICINE
Payer: MEDICAID

## 2022-08-26 VITALS
TEMPERATURE: 97.6 F | SYSTOLIC BLOOD PRESSURE: 123 MMHG | RESPIRATION RATE: 18 BRPM | HEART RATE: 77 BPM | HEIGHT: 68 IN | WEIGHT: 215 LBS | DIASTOLIC BLOOD PRESSURE: 88 MMHG | OXYGEN SATURATION: 100 % | BODY MASS INDEX: 32.58 KG/M2

## 2022-08-26 DIAGNOSIS — M79.604 PAIN IN BOTH LOWER EXTREMITIES: Primary | ICD-10-CM

## 2022-08-26 DIAGNOSIS — M79.605 PAIN IN BOTH LOWER EXTREMITIES: Primary | ICD-10-CM

## 2022-08-26 LAB
ALBUMIN SERPL-MCNC: 3.4 G/DL (ref 3.5–5)
ALBUMIN/GLOB SERPL: 0.9 {RATIO} (ref 1.1–2.2)
ALP SERPL-CCNC: 60 U/L (ref 45–117)
ALT SERPL-CCNC: 41 U/L (ref 12–78)
ANION GAP SERPL CALC-SCNC: 7 MMOL/L (ref 5–15)
AST SERPL-CCNC: 21 U/L (ref 15–37)
BASOPHILS # BLD: 0.1 K/UL (ref 0–0.1)
BASOPHILS NFR BLD: 1 % (ref 0–1)
BILIRUB SERPL-MCNC: 0.3 MG/DL (ref 0.2–1)
BUN SERPL-MCNC: 16 MG/DL (ref 6–20)
BUN/CREAT SERPL: 13 (ref 12–20)
CALCIUM SERPL-MCNC: 8.3 MG/DL (ref 8.5–10.1)
CHLORIDE SERPL-SCNC: 106 MMOL/L (ref 97–108)
CK SERPL-CCNC: 159 U/L (ref 39–308)
CO2 SERPL-SCNC: 30 MMOL/L (ref 21–32)
CREAT SERPL-MCNC: 1.2 MG/DL (ref 0.7–1.3)
DIFFERENTIAL METHOD BLD: NORMAL
EOSINOPHIL # BLD: 0.2 K/UL (ref 0–0.4)
EOSINOPHIL NFR BLD: 2 % (ref 0–7)
ERYTHROCYTE [DISTWIDTH] IN BLOOD BY AUTOMATED COUNT: 12.3 % (ref 11.5–14.5)
GLOBULIN SER CALC-MCNC: 3.6 G/DL (ref 2–4)
GLUCOSE SERPL-MCNC: 82 MG/DL (ref 65–100)
HCT VFR BLD AUTO: 47 % (ref 36.6–50.3)
HGB BLD-MCNC: 15.3 G/DL (ref 12.1–17)
IMM GRANULOCYTES # BLD AUTO: 0 K/UL (ref 0–0.04)
IMM GRANULOCYTES NFR BLD AUTO: 0 % (ref 0–0.5)
LYMPHOCYTES # BLD: 2.1 K/UL (ref 0.8–3.5)
LYMPHOCYTES NFR BLD: 30 % (ref 12–49)
MCH RBC QN AUTO: 31.6 PG (ref 26–34)
MCHC RBC AUTO-ENTMCNC: 32.6 G/DL (ref 30–36.5)
MCV RBC AUTO: 97.1 FL (ref 80–99)
MONOCYTES # BLD: 0.5 K/UL (ref 0–1)
MONOCYTES NFR BLD: 7 % (ref 5–13)
MYOGLOBIN SERPL-MCNC: 35 NG/ML (ref 16–116)
NEUTS SEG # BLD: 4.2 K/UL (ref 1.8–8)
NEUTS SEG NFR BLD: 60 % (ref 32–75)
NRBC # BLD: 0 K/UL (ref 0–0.01)
NRBC BLD-RTO: 0 PER 100 WBC
PLATELET # BLD AUTO: 195 K/UL (ref 150–400)
PMV BLD AUTO: 11.3 FL (ref 8.9–12.9)
POTASSIUM SERPL-SCNC: 4 MMOL/L (ref 3.5–5.1)
PROT SERPL-MCNC: 7 G/DL (ref 6.4–8.2)
RBC # BLD AUTO: 4.84 M/UL (ref 4.1–5.7)
SODIUM SERPL-SCNC: 143 MMOL/L (ref 136–145)
WBC # BLD AUTO: 7 K/UL (ref 4.1–11.1)

## 2022-08-26 PROCEDURE — 70450 CT HEAD/BRAIN W/O DYE: CPT

## 2022-08-26 PROCEDURE — 36415 COLL VENOUS BLD VENIPUNCTURE: CPT

## 2022-08-26 PROCEDURE — 82550 ASSAY OF CK (CPK): CPT

## 2022-08-26 PROCEDURE — 99284 EMERGENCY DEPT VISIT MOD MDM: CPT

## 2022-08-26 PROCEDURE — 83874 ASSAY OF MYOGLOBIN: CPT

## 2022-08-26 PROCEDURE — 85025 COMPLETE CBC W/AUTO DIFF WBC: CPT

## 2022-08-26 PROCEDURE — 80053 COMPREHEN METABOLIC PANEL: CPT

## 2022-08-26 NOTE — ED PROVIDER NOTES
Date of Service:  8/26/2022    Patient:  Adolph Chambers    Chief Complaint:  Leg Pain       HPI:  Adolph Chambers is a 40 y.o.  male who presents for evaluation of leg pain. Patient states that yesterday morning he woke up and he almost felt as his legs felt weak. He describes numbness but was able to feel some. He describes the sensation from the bilateral mid thighs and distally. He denied any type of headache fall trauma back pain change in vision change in hearing chest pain shortness of breath weakness elsewhere change in bowel or bladder habit. This morning he states that it is resolved but now he has an achy muscle type discomfort in the same distribution. Past Medical History:   Diagnosis Date    Abscess     Diabetes ([facility] [address]     History of high cholesterol     Hypercholesterolemia     Low testosterone in male 2017       Past Surgical History:   Procedure Laterality Date    HX HEENT      Left Eye         Family History:   Problem Relation Age of Onset    Diabetes Mother     Deep Vein Thrombosis Mother        Social History     Socioeconomic History    Marital status:      Spouse name: Not on file    Number of children: Not on file    Years of education: Not on file    Highest education level: Not on file   Occupational History    Not on file   Tobacco Use    Smoking status: Every Day     Packs/day: 0.50     Years: 17.00     Pack years: 8.50     Types: Cigarettes    Smokeless tobacco: Never   Vaping Use    Vaping Use: Never used   Substance and Sexual Activity    Alcohol use:  Yes     Alcohol/week: 9.0 standard drinks     Types: 6 Cans of beer, 3 Shots of liquor per week     Comment: occasionally 2-3 times a week    Drug use: Never    Sexual activity: Yes     Partners: Female     Birth control/protection: None   Other Topics Concern    Not on file   Social History Narrative    Not on file     Social Determinants of Health     Financial Resource Strain: Not on file   Food Insecurity: Not on file   Transportation Needs: Not on file   Physical Activity: Not on file   Stress: Not on file   Social Connections: Not on file   Intimate Partner Violence: Not on file   Housing Stability: Not on file         ALLERGIES: Patient has no known allergies. Review of Systems   Neurological:  Positive for weakness. All other systems reviewed and are negative. Vitals:    08/26/22 1306   BP: 123/88   Pulse: 77   Resp: 18   Temp: 97.6 °F (36.4 °C)   SpO2: 100%   Weight: 97.5 kg (215 lb)   Height: 5' 8\" (1.727 m)            Physical Exam  Vitals and nursing note reviewed. Constitutional:       Appearance: Normal appearance. HENT:      Head: Normocephalic. Nose: Nose normal.      Mouth/Throat:      Mouth: Mucous membranes are moist.   Cardiovascular:      Rate and Rhythm: Normal rate. Pulmonary:      Effort: Pulmonary effort is normal.   Abdominal:      General: Abdomen is flat. Musculoskeletal:         General: No deformity. Skin:     General: Skin is warm. Capillary Refill: Capillary refill takes less than 2 seconds. Neurological:      Mental Status: He is alert and oriented to person, place, and time. Sensory: No sensory deficit. Motor: No weakness. Gait: Gait normal.   Psychiatric:         Mood and Affect: Mood normal.        MDM       VITAL SIGNS:  No data found.       LABS:  Recent Results (from the past 6 hour(s))   CBC WITH AUTOMATED DIFF    Collection Time: 08/26/22  3:15 PM   Result Value Ref Range    WBC 7.0 4.1 - 11.1 K/uL    RBC 4.84 4.10 - 5.70 M/uL    HGB 15.3 12.1 - 17.0 g/dL    HCT 47.0 36.6 - 50.3 %    MCV 97.1 80.0 - 99.0 FL    MCH 31.6 26.0 - 34.0 PG    MCHC 32.6 30.0 - 36.5 g/dL    RDW 12.3 11.5 - 14.5 %    PLATELET 419 034 - 774 K/uL    MPV 11.3 8.9 - 12.9 FL    NRBC 0.0 0  WBC    ABSOLUTE NRBC 0.00 0.00 - 0.01 K/uL    NEUTROPHILS 60 32 - 75 %    LYMPHOCYTES 30 12 - 49 %    MONOCYTES 7 5 - 13 %    EOSINOPHILS 2 0 - 7 %    BASOPHILS 1 0 - 1 %    IMMATURE GRANULOCYTES 0 0.0 - 0.5 %    ABS. NEUTROPHILS 4.2 1.8 - 8.0 K/UL    ABS. LYMPHOCYTES 2.1 0.8 - 3.5 K/UL    ABS. MONOCYTES 0.5 0.0 - 1.0 K/UL    ABS. EOSINOPHILS 0.2 0.0 - 0.4 K/UL    ABS. BASOPHILS 0.1 0.0 - 0.1 K/UL    ABS. IMM. GRANS. 0.0 0.00 - 0.04 K/UL    DF AUTOMATED     CK    Collection Time: 08/26/22  3:15 PM   Result Value Ref Range     39 - 522 U/L   METABOLIC PANEL, COMPREHENSIVE    Collection Time: 08/26/22  3:15 PM   Result Value Ref Range    Sodium 143 136 - 145 mmol/L    Potassium 4.0 3.5 - 5.1 mmol/L    Chloride 106 97 - 108 mmol/L    CO2 30 21 - 32 mmol/L    Anion gap 7 5 - 15 mmol/L    Glucose 82 65 - 100 mg/dL    BUN 16 6 - 20 MG/DL    Creatinine 1.20 0.70 - 1.30 MG/DL    BUN/Creatinine ratio 13 12 - 20      GFR est AA >60 >60 ml/min/1.73m2    GFR est non-AA >60 >60 ml/min/1.73m2    Calcium 8.3 (L) 8.5 - 10.1 MG/DL    Bilirubin, total 0.3 0.2 - 1.0 MG/DL    ALT (SGPT) 41 12 - 78 U/L    AST (SGOT) 21 15 - 37 U/L    Alk. phosphatase 60 45 - 117 U/L    Protein, total 7.0 6.4 - 8.2 g/dL    Albumin 3.4 (L) 3.5 - 5.0 g/dL    Globulin 3.6 2.0 - 4.0 g/dL    A-G Ratio 0.9 (L) 1.1 - 2.2          IMAGING:  CT HEAD WO CONT   Final Result   No acute intracranial abnormality. Medications During Visit:  Medications - No data to display      DECISION MAKING:  Tima Valera is a 40 y.o. male who comes in as above. Here, patient is ambulating well with no signs of distress. No signs of weakness or decreased sensation. Because of his bilateral \"numbness\" from the mid thigh down yesterday that is now resolved is unknown but he appears well now. Will discharge home with outpatient follow-up      IMPRESSION:  1.  Pain in both lower extremities        DISPOSITION:  Discharged      Discharge Medication List as of 8/26/2022  5:35 PM           Follow-up Information       Follow up With Specialties Details Why Beatriz Moreno MD Internal Medicine Physician Schedule an appointment as soon as possible for a visit   [facility] [address] [facility]      [address] [place] [address] Emergency Medicine Go to  As needed, If symptoms worsen [address]              The patient is asked to follow-up with their primary care provider in the next several days. They are to call tomorrow for an appointment. The patient is asked to return promptly for any increased concerns or worsening of symptoms. They can return to this emergency department or any other emergency department.     Procedures

## 2022-08-26 NOTE — ED TRIAGE NOTES
Patient presents ambulatory to triage. Pt complains of bilateral leg pain and some swelling; onset yesterday after waking up 0900a. Pt states \"It likes I forgot how to walk, every time I would take a step its like I would fall forward\". Denies numbness and/or tingling. Denies injury, trauma or fall. Pt reports taking a \"muscle relaxer last night to help me sleep\". Slow steady gait in triage.

## 2022-08-26 NOTE — Clinical Note
[facility]  [address] [address] [phone]    Work/School Note    Date: 8/26/2022    To Whom It May concern:    Jovanna Pain was seen and treated today in the emergency room by the following provider(s):  Attending Provider: Anahy Marin DO. Jovanna Pain is excused from work/school on 08/26/22 and 08/27/22. He is medically clear to return to work/school on 8/28/2022.        Sincerely,          Mignon Rangel DO

## 2022-08-26 NOTE — ED NOTES
Pt discharged in stable condition at this time. MD/MIKAYLA reviewed discharge instructions and follow up with patient at bedside. MD provided patient with AVS and discharged patient.

## 2022-10-10 ENCOUNTER — OFFICE VISIT (OUTPATIENT)
Dept: PRIMARY CARE CLINIC | Age: 47
End: 2022-10-10
Payer: MEDICAID

## 2022-10-10 VITALS
RESPIRATION RATE: 16 BRPM | TEMPERATURE: 97.5 F | WEIGHT: 214 LBS | SYSTOLIC BLOOD PRESSURE: 132 MMHG | HEART RATE: 77 BPM | DIASTOLIC BLOOD PRESSURE: 91 MMHG | BODY MASS INDEX: 32.43 KG/M2 | HEIGHT: 68 IN | OXYGEN SATURATION: 100 %

## 2022-10-10 DIAGNOSIS — E11.9 DM TYPE 2, GOAL HBA1C < 7% (HCC): Primary | ICD-10-CM

## 2022-10-10 DIAGNOSIS — E78.5 HYPERLIPIDEMIA, UNSPECIFIED HYPERLIPIDEMIA TYPE: ICD-10-CM

## 2022-10-10 DIAGNOSIS — K57.90 DIVERTICULOSIS: ICD-10-CM

## 2022-10-10 DIAGNOSIS — G89.29 CHRONIC PAIN OF LEFT ANKLE: ICD-10-CM

## 2022-10-10 DIAGNOSIS — R79.89 LOW TESTOSTERONE IN MALE: ICD-10-CM

## 2022-10-10 DIAGNOSIS — M25.572 CHRONIC PAIN OF LEFT ANKLE: ICD-10-CM

## 2022-10-10 DIAGNOSIS — H54.40 BLINDNESS OF LEFT EYE WITH NORMAL VISION IN CONTRALATERAL EYE: ICD-10-CM

## 2022-10-10 PROCEDURE — 3044F HG A1C LEVEL LT 7.0%: CPT | Performed by: INTERNAL MEDICINE

## 2022-10-10 PROCEDURE — 99214 OFFICE O/P EST MOD 30 MIN: CPT | Performed by: INTERNAL MEDICINE

## 2022-10-10 NOTE — PROGRESS NOTES
Chief Complaint   Patient presents with    Follow-up       Health Maintenance Due   Topic    Hepatitis C Screening     Pneumococcal 0-64 years (1 - PCV)    Foot Exam Q1     Eye Exam Retinal or Dilated     DTaP/Tdap/Td series (1 - Tdap)    Colorectal Cancer Screening Combo     COVID-19 Vaccine (3 - Booster for Pfizer series)    Flu Vaccine (1)        1. Have you been to the ER, urgent care clinic since your last visit?  hospitalized since your last visit? Yes [facility] for left ankle pain    2. Have you seen or consulted any other health care providers outside of the [address] [place] [address] since your last visit? Include any pap smears or colon screening.  No    Visit Vitals  BP (!) 132/91 (BP 1 Location: Right arm, BP Patient Position: Sitting, BP Cuff Size: Adult long)   Pulse 67   Temp 97.5 °F (36.4 °C) (Temporal)   Resp 16   Ht 5' 8\" (1.727 m)   Wt 214 lb (97.1 kg)   SpO2 100%   BMI 32.54 kg/m²

## 2022-10-12 LAB
CHOLEST SERPL-MCNC: 196 MG/DL (ref 100–199)
EST. AVERAGE GLUCOSE BLD GHB EST-MCNC: 123 MG/DL
FSH SERPL-ACNC: 12.2 MIU/ML (ref 1.5–12.4)
HBA1C MFR BLD: 5.9 % (ref 4.8–5.6)
HDLC SERPL-MCNC: 78 MG/DL
LDLC SERPL CALC-MCNC: 102 MG/DL (ref 0–99)
LH SERPL-ACNC: 9.7 MIU/ML (ref 1.7–8.6)
PROLACTIN SERPL-MCNC: 7.5 NG/ML (ref 4–15.2)
TESTOST FREE SERPL-MCNC: 13.3 PG/ML (ref 6.8–21.5)
TESTOST SERPL-MCNC: 522 NG/DL (ref 264–916)
TRIGL SERPL-MCNC: 88 MG/DL (ref 0–149)
URATE SERPL-MCNC: 8.3 MG/DL (ref 3.8–8.4)
VLDLC SERPL CALC-MCNC: 16 MG/DL (ref 5–40)

## 2022-10-12 NOTE — PROGRESS NOTES
Testosterone levels are normal.  Hemoglobin A1c is 5.9.   You are in the prediabetic range  Watch diet  Triglycerides have improved  Prolactin levels are normal  Uric acid level is high normal.  cut back on seafood red meat and avoid any alcohol

## 2022-12-27 ENCOUNTER — APPOINTMENT (OUTPATIENT)
Dept: GENERAL RADIOLOGY | Age: 47
End: 2022-12-27
Attending: EMERGENCY MEDICINE
Payer: MEDICAID

## 2022-12-27 ENCOUNTER — HOSPITAL ENCOUNTER (EMERGENCY)
Age: 47
Discharge: HOME | End: 2022-12-27
Attending: EMERGENCY MEDICINE
Payer: MEDICAID

## 2022-12-27 VITALS
SYSTOLIC BLOOD PRESSURE: 145 MMHG | HEIGHT: 68 IN | HEART RATE: 91 BPM | BODY MASS INDEX: 31.52 KG/M2 | WEIGHT: 208 LBS | OXYGEN SATURATION: 99 % | TEMPERATURE: 98.4 F | DIASTOLIC BLOOD PRESSURE: 99 MMHG | RESPIRATION RATE: 20 BRPM

## 2022-12-27 DIAGNOSIS — S16.1XXA STRAIN OF NECK MUSCLE, INITIAL ENCOUNTER: Primary | ICD-10-CM

## 2022-12-27 PROCEDURE — 99283 EMERGENCY DEPT VISIT LOW MDM: CPT

## 2022-12-27 PROCEDURE — 72050 X-RAY EXAM NECK SPINE 4/5VWS: CPT

## 2022-12-27 PROCEDURE — 74011250637 HC RX REV CODE- 250/637: Performed by: EMERGENCY MEDICINE

## 2022-12-27 RX ORDER — METHOCARBAMOL 750 MG/1
750 TABLET, FILM COATED ORAL
Qty: 21 TABLET | Refills: 0 | Status: SHIPPED | OUTPATIENT
Start: 2022-12-27 | End: 2023-01-03

## 2022-12-27 RX ORDER — ACETAMINOPHEN 500 MG
1000 TABLET ORAL ONCE
Status: COMPLETED | OUTPATIENT
Start: 2022-12-27 | End: 2022-12-27

## 2022-12-27 RX ADMIN — ACETAMINOPHEN 1000 MG: 500 TABLET ORAL at 18:49

## 2022-12-27 NOTE — ED PROVIDER NOTES
The history is provided by the patient. Neck Pain   This is a new problem. The current episode started more than 1 week ago. The problem occurs daily. The problem has not changed since onset. The pain is associated with lifting a heavy object. There has been no fever. The pain is present in the left side. The quality of the pain is described as aching. The pain does not radiate. The pain is moderate. The symptoms are aggravated by certain positions and twisting. Stiffness is present In the morning. Pertinent negatives include no photophobia, no visual change, no chest pain, no syncope, no numbness, no weight loss, no headaches, no bowel incontinence, no bladder incontinence, no leg pain, no paresis, no tingling and no weakness. He has tried nothing for the symptoms. The treatment provided no relief. Past Medical History:   Diagnosis Date    Abscess     Diabetes ([address]     History of high cholesterol     Hypercholesterolemia     Low testosterone in male 2017       Past Surgical History:   Procedure Laterality Date    HX HEENT      Left Eye         Family History:   Problem Relation Age of Onset    Diabetes Mother     Deep Vein Thrombosis Mother        Social History     Socioeconomic History    Marital status:      Spouse name: Not on file    Number of children: Not on file    Years of education: Not on file    Highest education level: Not on file   Occupational History    Not on file   Tobacco Use    Smoking status: Every Day     Packs/day: 0.50     Years: 17.00     Pack years: 8.50     Types: Cigarettes    Smokeless tobacco: Never   Vaping Use    Vaping Use: Never used   Substance and Sexual Activity    Alcohol use:  Yes     Alcohol/week: 9.0 standard drinks     Types: 6 Cans of beer, 3 Shots of liquor per week     Comment: occasionally 2-3 times a week    Drug use: Never    Sexual activity: Yes     Partners: Female     Birth control/protection: None   Other Topics Concern    Not on file   Social History Narrative    Not on file     Social Determinants of Health     Financial Resource Strain: Not on file   Food Insecurity: Not on file   Transportation Needs: Not on file   Physical Activity: Not on file   Stress: Not on file   Social Connections: Not on file   Intimate Partner Violence: Not on file   Housing Stability: Not on file         ALLERGIES: Patient has no known allergies. Review of Systems   Constitutional:  Negative for activity change, chills, fever and weight loss. HENT:  Negative for nosebleeds, sore throat, trouble swallowing and voice change. Eyes:  Negative for photophobia and visual disturbance. Respiratory:  Negative for shortness of breath. Cardiovascular:  Negative for chest pain, palpitations and syncope. Gastrointestinal:  Negative for abdominal pain, bowel incontinence, constipation, diarrhea and nausea. Genitourinary:  Negative for bladder incontinence, difficulty urinating, dysuria, hematuria and urgency. Musculoskeletal:  Positive for neck pain. Negative for back pain and neck stiffness. Skin:  Negative for color change. Allergic/Immunologic: Negative for immunocompromised state. Neurological:  Negative for dizziness, tingling, seizures, syncope, weakness, light-headedness, numbness and headaches. Psychiatric/Behavioral:  Negative for behavioral problems, confusion, hallucinations, self-injury and suicidal ideas. Vitals:    12/27/22 1730   BP: (!) 145/99   Pulse: 91   Resp: 20   Temp: 98.4 °F (36.9 °C)   SpO2: 99%   Weight: 94.3 kg (208 lb)   Height: 5' 8\" (1.727 m)            Physical Exam  Vitals and nursing note reviewed. Constitutional:       General: He is not in acute distress. Appearance: He is well-developed. He is not diaphoretic. HENT:      Head: Atraumatic. Neck:      Trachea: No tracheal deviation. Cardiovascular:      Comments: Warm and well perfused  Pulmonary:      Effort: Pulmonary effort is normal. No respiratory distress. Musculoskeletal:         General: Normal range of motion. Cervical back: Muscular tenderness present. No spinous process tenderness. Skin:     General: Skin is warm and dry. Neurological:      Mental Status: He is alert. Coordination: Coordination normal.   Psychiatric:         Behavior: Behavior normal.         Thought Content: Thought content normal.         Judgment: Judgment normal.        MDM    This is a 60-year-old male with past medical history, review of systems, physical exam as above, presenting with complaints of left-sided neck pain. Patient states he noted pain approximately 3 weeks ago, notes the pain is worse when turning his head, or lifting his arms. He denies injury, sprain or strain, denies midline cervical pain, states that discomfort is on the left. He denies numbness or weakness, headache or visual disturbance. He states he has not been taking any medication for the pain. Physical exam is remarkable for a well-appearing middle-age male, in no acute distress noted to be mildly hypertensive, afebrile without tachycardia, satting well on room air. He has tenderness over the left paracervical muscles, worse when touching his chin to his chest or turning his head. Discussed with the patient, likely cervical strain, versus spasm. Plan to provide pain control, and obtain plain films. We will reassess, and make a disposition.     Procedures

## 2022-12-27 NOTE — ED TRIAGE NOTES
Patient reports left sided neck pain x 1 month. Its worse when waking up and eases throughout the day.

## 2023-05-20 RX ORDER — METFORMIN HYDROCHLORIDE 500 MG/1
500 TABLET, EXTENDED RELEASE ORAL
COMMUNITY
Start: 2020-12-15

## 2023-05-20 RX ORDER — DICYCLOMINE HYDROCHLORIDE 10 MG/1
10 CAPSULE ORAL EVERY 6 HOURS PRN
COMMUNITY
Start: 2022-01-03

## 2023-05-20 RX ORDER — PANTOPRAZOLE SODIUM 40 MG/1
1 TABLET, DELAYED RELEASE ORAL DAILY
COMMUNITY
Start: 2021-11-17

## 2023-05-20 RX ORDER — FENOFIBRATE 145 MG/1
145 TABLET, COATED ORAL DAILY
COMMUNITY
Start: 2020-12-15

## 2023-09-14 NOTE — PROGRESS NOTES
Vanesa Cook is a 40 y.o.  male and presents with     Chief Complaint   Patient presents with    Follow-up     Pt was last seen in January. Pt was seen in August in ER for leg pain. Blood pressure is slightly elevated. Ptwent to [facility] and was told he has tenditnitis in left foot. Pt says sometimes his leg sometimes stop working  Pt works and has to walk a lot at the [employer]. Pt has loss of libido      Past Medical History:   Diagnosis Date    Abscess     Diabetes ([address]     History of high cholesterol     Hypercholesterolemia     Low testosterone in male 2017     Past Surgical History:   Procedure Laterality Date    HX HEENT      Left Eye     Current Outpatient Medications   Medication Sig    dicyclomine (BENTYL) 10 mg capsule Take 1 Capsule by mouth every six (6) hours as needed for Abdominal Cramps or Cramping. pantoprazole (PROTONIX) 40 mg tablet TAKE ONE TABLET BY MOUTH ONCE DAILY. metFORMIN ER (GLUCOPHAGE XR) 500 mg tablet Take 1 Tab by mouth daily (with dinner). 1 x time daily with dinner    fenofibrate nanocrystallized (TRICOR) 145 mg tablet Take 1 Tab by mouth daily. Takes by mouth 1x daily with dinner. No current facility-administered medications for this visit.      Health Maintenance   Topic Date Due    Hepatitis C Screening  Never done    Pneumococcal 0-64 years (1 - PCV) Never done    Foot Exam Q1  Never done    Eye Exam Retinal or Dilated  Never done    DTaP/Tdap/Td series (1 - Tdap) Never done    Colorectal Cancer Screening Combo  Never done    COVID-19 Vaccine (3 - Booster for Pfizer series) 01/27/2022    Flu Vaccine (1) 08/01/2022    Depression Screen  01/12/2023    A1C test (Diabetic or Prediabetic)  01/12/2023    MICROALBUMIN Q1  01/12/2023    Lipid Screen  01/12/2023     Immunization History   Administered Date(s) Administered    COVID-19, PFIZER PURPLE top, DILUTE for use, (age 15 y+), IM, 30mcg/0.3mL 08/06/2021, 08/27/2021    Influenza, FLUARIX, Irisa Lose (age 10 mo+) AND AFLURIA, (age 1 y+), PF, 0.5mL 12/15/2020     No LMP for male patient. Allergies and Intolerances:   No Known Allergies    Family History:   Family History   Problem Relation Age of Onset    Diabetes Mother     Deep Vein Thrombosis Mother        Social History:   He  reports that he has been smoking. He has a 8.50 pack-year smoking history. He has never used smokeless tobacco.  He  reports current alcohol use of about 9.0 standard drinks per week.             Review of Systems:   General: negative for - chills, fatigue, fever, weight change  Psych: negative for - anxiety, depression, irritability or mood swings  ENT: negative for - headaches, hearing change, nasal congestion, oral lesions, sneezing or sore throat  Heme/ Lymph: negative for - bleeding problems, bruising, pallor or swollen lymph nodes  Endo: negative for - hot flashes, polydipsia/polyuria or temperature intolerance  Resp: negative for - cough, shortness of breath or wheezing  CV: negative for - chest pain, edema or palpitations  GI: negative for - abdominal pain, change in bowel habits, constipation, diarrhea or nausea/vomiting  : negative for - dysuria, hematuria, incontinence, pelvic pain or vulvar/vaginal symptoms  MSK: negative for - joint pain, joint swelling or muscle pain  Neuro: negative for - confusion, headaches, seizures or weakness  Derm: negative for - dry skin, hair changes, rash or skin lesion changes          Physical:   Vitals:   Vitals:    10/10/22 0920 10/10/22 0921   BP: (!) 136/91 (!) 132/91   Pulse: 77    Resp: 16    Temp: 97.5 °F (36.4 °C)    TempSrc: Temporal    SpO2: 100%    Weight: 214 lb (97.1 kg)    Height: 5' 8\" (1.727 m)            Exam:   HEENT- atraumatic,normocephalic, awake, oriented, well nourished  Neck - supple,no enlarged lymph nodes, no JVD, no thyromegaly  Chest- CTA, no rhonchi, no crackles  Heart- rrr, no murmurs / gallop/rub  Abdomen- soft,BS+,NT, no hepatosplenomegaly  Ext - no c/c/edema   Neuro- no focal deficits. Power 5/5 all extremities  Skin - warm,dry, no obvious rashes. Review of Data:   LABS:   Lab Results   Component Value Date/Time    WBC 7.0 08/26/2022 03:15 PM    HGB 15.3 08/26/2022 03:15 PM    HCT 47.0 08/26/2022 03:15 PM    PLATELET 516 37/62/0050 03:15 PM     Lab Results   Component Value Date/Time    Sodium 143 08/26/2022 03:15 PM    Potassium 4.0 08/26/2022 03:15 PM    Chloride 106 08/26/2022 03:15 PM    CO2 30 08/26/2022 03:15 PM    Glucose 82 08/26/2022 03:15 PM    BUN 16 08/26/2022 03:15 PM    Creatinine 1.20 08/26/2022 03:15 PM     Lab Results   Component Value Date/Time    Cholesterol, total 188 01/12/2022 12:18 PM    HDL Cholesterol 63 01/12/2022 12:18 PM    LDL, calculated 81 01/12/2022 12:18 PM    Triglyceride 220 (H) 01/12/2022 12:18 PM     No components found for: GPT        Impression / Plan:        ICD-10-CM ICD-9-CM    1. DM type 2, goal HbA1c < 7% ([facility])  E11.9 250.00 HEMOGLOBIN A1C WITH EAG      LIPID PANEL      HEMOGLOBIN A1C WITH EAG      LIPID PANEL      REFERRAL TO OPHTHALMOLOGY      2. Hyperlipidemia, unspecified hyperlipidemia type  E78.5 272.4       3. Diverticulosis  K57.90 562.10       4. Low testosterone in male  R74.80 790.99 TESTOSTERONE, FREE & TOTAL      FSH AND LH      PROLACTIN      TESTOSTERONE, FREE & TOTAL      FSH AND LH      PROLACTIN      5. Blindness of left eye with normal vision in contralateral eye  H54.40 369.60 REFERRAL TO OPHTHALMOLOGY      6. Chronic pain of left ankle  M25.572 719.47 REFERRAL TO PODIATRY    G89.29 338.29 URIC ACID      URIC ACID          Explained to patient risk benefits of the medications. Advised patient to stop meds if having any side effects. Pt verbalized understanding of the instructions. I have discussed the diagnosis with the patient and the intended plan as seen in the above orders.   The patient has received an after-visit summary and questions were answered concerning future plans. I have discussed medication side effects and warnings with the patient as well. I have reviewed the plan of care with the patient, accepted their input and they are in agreement with the treatment goals. Reviewed plan of care. Patient has provided input and agrees with goals. Follow-up and Dispositions    Return in about 6 months (around 4/10/2023).          Devendra Avalos MD No Vaccines Administered.